# Patient Record
Sex: MALE | Race: WHITE | Employment: FULL TIME | URBAN - METROPOLITAN AREA
[De-identification: names, ages, dates, MRNs, and addresses within clinical notes are randomized per-mention and may not be internally consistent; named-entity substitution may affect disease eponyms.]

---

## 2021-10-08 ENCOUNTER — HOSPITAL ENCOUNTER (INPATIENT)
Age: 56
LOS: 4 days | Discharge: HOME | DRG: 254 | End: 2021-10-12
Attending: INTERNAL MEDICINE | Admitting: INTERNAL MEDICINE
Payer: MEDICAID

## 2021-10-08 ENCOUNTER — APPOINTMENT (OUTPATIENT)
Dept: MRI IMAGING | Age: 56
DRG: 254 | End: 2021-10-08
Attending: HOSPITALIST
Payer: MEDICAID

## 2021-10-08 PROBLEM — K83.09 CHOLANGITIS: Status: ACTIVE | Noted: 2021-10-08

## 2021-10-08 LAB
ALBUMIN SERPL-MCNC: 1.9 G/DL (ref 3.5–5)
ALBUMIN SERPL-MCNC: 1.9 G/DL (ref 3.5–5)
ALBUMIN/GLOB SERPL: 0.4 {RATIO} (ref 1.1–2.2)
ALBUMIN/GLOB SERPL: 0.5 {RATIO} (ref 1.1–2.2)
ALP SERPL-CCNC: 259 U/L (ref 45–117)
ALP SERPL-CCNC: 285 U/L (ref 45–117)
ALT SERPL-CCNC: 75 U/L (ref 12–78)
ALT SERPL-CCNC: 81 U/L (ref 12–78)
ANION GAP SERPL CALC-SCNC: 5 MMOL/L (ref 5–15)
APTT PPP: 26.6 SEC (ref 22.1–31)
AST SERPL-CCNC: 37 U/L (ref 15–37)
AST SERPL-CCNC: 40 U/L (ref 15–37)
BASOPHILS # BLD: 0 K/UL (ref 0–0.1)
BASOPHILS NFR BLD: 0 % (ref 0–1)
BILIRUB DIRECT SERPL-MCNC: 0.6 MG/DL (ref 0–0.2)
BILIRUB SERPL-MCNC: 1 MG/DL (ref 0.2–1)
BILIRUB SERPL-MCNC: 1 MG/DL (ref 0.2–1)
BUN SERPL-MCNC: 24 MG/DL (ref 6–20)
BUN/CREAT SERPL: 31 (ref 12–20)
CALCIUM SERPL-MCNC: 9 MG/DL (ref 8.5–10.1)
CHLORIDE SERPL-SCNC: 108 MMOL/L (ref 97–108)
CO2 SERPL-SCNC: 24 MMOL/L (ref 21–32)
CREAT SERPL-MCNC: 0.78 MG/DL (ref 0.7–1.3)
DIFFERENTIAL METHOD BLD: ABNORMAL
EOSINOPHIL # BLD: 0 K/UL (ref 0–0.4)
EOSINOPHIL NFR BLD: 0 % (ref 0–7)
ERYTHROCYTE [DISTWIDTH] IN BLOOD BY AUTOMATED COUNT: 15.3 % (ref 11.5–14.5)
GLOBULIN SER CALC-MCNC: 4.2 G/DL (ref 2–4)
GLOBULIN SER CALC-MCNC: 5.1 G/DL (ref 2–4)
GLUCOSE BLD STRIP.AUTO-MCNC: 100 MG/DL (ref 65–117)
GLUCOSE BLD STRIP.AUTO-MCNC: 111 MG/DL (ref 65–117)
GLUCOSE BLD STRIP.AUTO-MCNC: 84 MG/DL (ref 65–117)
GLUCOSE BLD STRIP.AUTO-MCNC: 96 MG/DL (ref 65–117)
GLUCOSE SERPL-MCNC: 95 MG/DL (ref 65–100)
HCT VFR BLD AUTO: 32.2 % (ref 36.6–50.3)
HGB BLD-MCNC: 10.4 G/DL (ref 12.1–17)
IMM GRANULOCYTES # BLD AUTO: 0.1 K/UL (ref 0–0.04)
IMM GRANULOCYTES NFR BLD AUTO: 1 % (ref 0–0.5)
LACTATE SERPL-SCNC: 0.7 MMOL/L (ref 0.4–2)
LYMPHOCYTES # BLD: 0.8 K/UL (ref 0.8–3.5)
LYMPHOCYTES NFR BLD: 7 % (ref 12–49)
MAGNESIUM SERPL-MCNC: 1.9 MG/DL (ref 1.6–2.4)
MCH RBC QN AUTO: 28.4 PG (ref 26–34)
MCHC RBC AUTO-ENTMCNC: 32.3 G/DL (ref 30–36.5)
MCV RBC AUTO: 88 FL (ref 80–99)
MONOCYTES # BLD: 0.4 K/UL (ref 0–1)
MONOCYTES NFR BLD: 3 % (ref 5–13)
NEUTS SEG # BLD: 11.1 K/UL (ref 1.8–8)
NEUTS SEG NFR BLD: 89 % (ref 32–75)
NRBC # BLD: 0 K/UL (ref 0–0.01)
NRBC BLD-RTO: 0 PER 100 WBC
PLATELET # BLD AUTO: 250 K/UL (ref 150–400)
PMV BLD AUTO: 8.5 FL (ref 8.9–12.9)
POTASSIUM SERPL-SCNC: 4 MMOL/L (ref 3.5–5.1)
PROT SERPL-MCNC: 6.1 G/DL (ref 6.4–8.2)
PROT SERPL-MCNC: 7 G/DL (ref 6.4–8.2)
RBC # BLD AUTO: 3.66 M/UL (ref 4.1–5.7)
SERVICE CMNT-IMP: NORMAL
SODIUM SERPL-SCNC: 137 MMOL/L (ref 136–145)
THERAPEUTIC RANGE,PTTT: NORMAL SECS (ref 58–77)
WBC # BLD AUTO: 12.5 K/UL (ref 4.1–11.1)

## 2021-10-08 PROCEDURE — 80053 COMPREHEN METABOLIC PANEL: CPT

## 2021-10-08 PROCEDURE — 74011250636 HC RX REV CODE- 250/636: Performed by: INTERNAL MEDICINE

## 2021-10-08 PROCEDURE — 74011000258 HC RX REV CODE- 258: Performed by: INTERNAL MEDICINE

## 2021-10-08 PROCEDURE — 74011250636 HC RX REV CODE- 250/636: Performed by: HOSPITALIST

## 2021-10-08 PROCEDURE — 74183 MRI ABD W/O CNTR FLWD CNTR: CPT

## 2021-10-08 PROCEDURE — C9113 INJ PANTOPRAZOLE SODIUM, VIA: HCPCS | Performed by: HOSPITALIST

## 2021-10-08 PROCEDURE — 85025 COMPLETE CBC W/AUTO DIFF WBC: CPT

## 2021-10-08 PROCEDURE — 87040 BLOOD CULTURE FOR BACTERIA: CPT

## 2021-10-08 PROCEDURE — 65660000000 HC RM CCU STEPDOWN

## 2021-10-08 PROCEDURE — 74011250637 HC RX REV CODE- 250/637: Performed by: HOSPITALIST

## 2021-10-08 PROCEDURE — 36415 COLL VENOUS BLD VENIPUNCTURE: CPT

## 2021-10-08 PROCEDURE — 83735 ASSAY OF MAGNESIUM: CPT

## 2021-10-08 PROCEDURE — 85730 THROMBOPLASTIN TIME PARTIAL: CPT

## 2021-10-08 PROCEDURE — 82962 GLUCOSE BLOOD TEST: CPT

## 2021-10-08 PROCEDURE — A9575 INJ GADOTERATE MEGLUMI 0.1ML: HCPCS | Performed by: INTERNAL MEDICINE

## 2021-10-08 PROCEDURE — 74011000258 HC RX REV CODE- 258: Performed by: HOSPITALIST

## 2021-10-08 PROCEDURE — 83605 ASSAY OF LACTIC ACID: CPT

## 2021-10-08 PROCEDURE — 80076 HEPATIC FUNCTION PANEL: CPT

## 2021-10-08 RX ORDER — ONDANSETRON 2 MG/ML
4 INJECTION INTRAMUSCULAR; INTRAVENOUS
Status: DISCONTINUED | OUTPATIENT
Start: 2021-10-08 | End: 2021-10-12 | Stop reason: HOSPADM

## 2021-10-08 RX ORDER — ENOXAPARIN SODIUM 100 MG/ML
40 INJECTION SUBCUTANEOUS DAILY
Status: DISCONTINUED | OUTPATIENT
Start: 2021-10-08 | End: 2021-10-08

## 2021-10-08 RX ORDER — ONDANSETRON 4 MG/1
4 TABLET, ORALLY DISINTEGRATING ORAL
Status: DISCONTINUED | OUTPATIENT
Start: 2021-10-08 | End: 2021-10-12 | Stop reason: HOSPADM

## 2021-10-08 RX ORDER — POLYETHYLENE GLYCOL 3350 17 G/17G
17 POWDER, FOR SOLUTION ORAL DAILY PRN
Status: DISCONTINUED | OUTPATIENT
Start: 2021-10-08 | End: 2021-10-12 | Stop reason: HOSPADM

## 2021-10-08 RX ORDER — SODIUM CHLORIDE 0.9 % (FLUSH) 0.9 %
5-40 SYRINGE (ML) INJECTION EVERY 8 HOURS
Status: DISCONTINUED | OUTPATIENT
Start: 2021-10-08 | End: 2021-10-12 | Stop reason: HOSPADM

## 2021-10-08 RX ORDER — SODIUM CHLORIDE 9 MG/ML
125 INJECTION, SOLUTION INTRAVENOUS CONTINUOUS
Status: DISCONTINUED | OUTPATIENT
Start: 2021-10-08 | End: 2021-10-08

## 2021-10-08 RX ORDER — SODIUM CHLORIDE 9 MG/ML
75 INJECTION, SOLUTION INTRAVENOUS CONTINUOUS
Status: DISCONTINUED | OUTPATIENT
Start: 2021-10-08 | End: 2021-10-09

## 2021-10-08 RX ORDER — DEXTROSE MONOHYDRATE AND SODIUM CHLORIDE 5; .9 G/100ML; G/100ML
100 INJECTION, SOLUTION INTRAVENOUS CONTINUOUS
Status: DISCONTINUED | OUTPATIENT
Start: 2021-10-08 | End: 2021-10-08

## 2021-10-08 RX ORDER — ACETAMINOPHEN 325 MG/1
650 TABLET ORAL
Status: DISCONTINUED | OUTPATIENT
Start: 2021-10-08 | End: 2021-10-12 | Stop reason: HOSPADM

## 2021-10-08 RX ORDER — SODIUM CHLORIDE 0.9 % (FLUSH) 0.9 %
5-40 SYRINGE (ML) INJECTION AS NEEDED
Status: DISCONTINUED | OUTPATIENT
Start: 2021-10-08 | End: 2021-10-12 | Stop reason: HOSPADM

## 2021-10-08 RX ORDER — MAGNESIUM SULFATE 100 %
4 CRYSTALS MISCELLANEOUS AS NEEDED
Status: DISCONTINUED | OUTPATIENT
Start: 2021-10-08 | End: 2021-10-12 | Stop reason: HOSPADM

## 2021-10-08 RX ORDER — GADOTERATE MEGLUMINE 376.9 MG/ML
20 INJECTION INTRAVENOUS
Status: COMPLETED | OUTPATIENT
Start: 2021-10-08 | End: 2021-10-08

## 2021-10-08 RX ORDER — INSULIN LISPRO 100 [IU]/ML
INJECTION, SOLUTION INTRAVENOUS; SUBCUTANEOUS EVERY 6 HOURS
Status: DISCONTINUED | OUTPATIENT
Start: 2021-10-08 | End: 2021-10-08 | Stop reason: SDUPTHER

## 2021-10-08 RX ORDER — DEXTROSE 50 % IN WATER (D50W) INTRAVENOUS SYRINGE
12.5-25 AS NEEDED
Status: DISCONTINUED | OUTPATIENT
Start: 2021-10-08 | End: 2021-10-08 | Stop reason: SDUPTHER

## 2021-10-08 RX ORDER — DEXTROSE 50 % IN WATER (D50W) INTRAVENOUS SYRINGE
12.5-25 AS NEEDED
Status: DISCONTINUED | OUTPATIENT
Start: 2021-10-08 | End: 2021-10-12 | Stop reason: HOSPADM

## 2021-10-08 RX ORDER — MAGNESIUM SULFATE 100 %
4 CRYSTALS MISCELLANEOUS AS NEEDED
Status: DISCONTINUED | OUTPATIENT
Start: 2021-10-08 | End: 2021-10-08 | Stop reason: SDUPTHER

## 2021-10-08 RX ORDER — INSULIN LISPRO 100 [IU]/ML
INJECTION, SOLUTION INTRAVENOUS; SUBCUTANEOUS EVERY 6 HOURS
Status: DISCONTINUED | OUTPATIENT
Start: 2021-10-08 | End: 2021-10-12 | Stop reason: HOSPADM

## 2021-10-08 RX ORDER — ACETAMINOPHEN 650 MG/1
650 SUPPOSITORY RECTAL
Status: DISCONTINUED | OUTPATIENT
Start: 2021-10-08 | End: 2021-10-12 | Stop reason: HOSPADM

## 2021-10-08 RX ORDER — SUCRALFATE 1 G/1
1 TABLET ORAL
Status: DISCONTINUED | OUTPATIENT
Start: 2021-10-08 | End: 2021-10-12 | Stop reason: HOSPADM

## 2021-10-08 RX ADMIN — GADOTERATE MEGLUMINE 20 ML: 376.9 INJECTION INTRAVENOUS at 14:30

## 2021-10-08 RX ADMIN — SODIUM CHLORIDE 100 ML: 900 INJECTION, SOLUTION INTRAVENOUS at 14:30

## 2021-10-08 RX ADMIN — PANTOPRAZOLE SODIUM 40 MG: 40 INJECTION, POWDER, FOR SOLUTION INTRAVENOUS at 08:53

## 2021-10-08 RX ADMIN — PIPERACILLIN AND TAZOBACTAM 3.38 G: 3; .375 INJECTION, POWDER, LYOPHILIZED, FOR SOLUTION INTRAVENOUS at 17:38

## 2021-10-08 RX ADMIN — Medication 10 ML: at 08:54

## 2021-10-08 RX ADMIN — SODIUM CHLORIDE 125 ML/HR: 9 INJECTION, SOLUTION INTRAVENOUS at 08:58

## 2021-10-08 RX ADMIN — SUCRALFATE 1 G: 1 TABLET ORAL at 21:09

## 2021-10-08 RX ADMIN — Medication 10 ML: at 15:07

## 2021-10-08 RX ADMIN — SODIUM CHLORIDE 75 ML/HR: 9 INJECTION, SOLUTION INTRAVENOUS at 21:10

## 2021-10-08 RX ADMIN — PIPERACILLIN AND TAZOBACTAM 3.38 G: 3; .375 INJECTION, POWDER, LYOPHILIZED, FOR SOLUTION INTRAVENOUS at 08:53

## 2021-10-08 RX ADMIN — SODIUM CHLORIDE 40 MG: 9 INJECTION, SOLUTION INTRAMUSCULAR; INTRAVENOUS; SUBCUTANEOUS at 21:09

## 2021-10-08 NOTE — H&P
[address]  HISTORY AND PHYSICAL    Name:  Codi Finney  MR#:  [id number]  :  1965  ACCOUNT #:  [de-identified]  ADMIT DATE:  10/08/2021      PRIMARY CARE PROVIDER:  Sergio Keller DO    SOURCE OF INFORMATION:  The patient. The patient is transferred from [facility] for management of acute cholangitis with possible sepsis. HISTORY OF PRESENT ILLNESS:  This is a 50-year-old  male with past medical history significant for hypertension and diabetes, who is transferred from [facility] for management of ascending cholangitis with possible sepsis and possible ERCP. The patient presented to [facility] ED with diffuse abdominal pain, fever, and nausea a week duration. He stated that he went to see his primary care physician yesterday and he had a lab work and he was given medication and went home. He said he took his medication but his fevers, chills, nausea, worse and went to [facility] ER. In the emergency room, he had blood workup which showed leukocytosis, elevated liver enzymes, lactic acid 3.8. He had ultrasound examination which showed liver slightly enlarged measuring 18. 6. He had also CT scan of the abdomen and pelvis which showed cholelithiasis without cholecystitis, 1-mm right renal stone, and multiple left renal stones non-obstructing. His ALT 97, AST is 63, alkaline phosphatase 399, and procalcitonin was 0.7. He was given IV antibiotics, IV fluids, Tylenol. Blood culture was drawn and transferred to [facility] emergency department. On evaluation here, he said his abdominal pain is improved. Vital signs on arrival was blood pressure 110/68, pulse 72, temperature 97.9, and saturation of oxygen 96%. He said he is fully vaccinated for COVID-19 and he said he was tested for COVID 19 and test result was negative at [facility].   He is transferred with his medical document and a copy of imaging study.    REVIEW OF SYSTEMS:  Pertinent positive findings mentioned in the HPI. All systems reviewed, no any other positive finding. PAST MEDICAL HISTORY:  1. Type 2 diabetes mellitus. 2.  Hypertension. PRIOR TO ADMISSION MEDICATION:  The patient takes metformin but he does not remember his blood pressure medication. ALLERGIES:  NO KNOWN DRUG ALLERGIES. SOCIAL HISTORY:  Lives by himself. No tobacco or alcohol abuse. Ambulates independently. Code status, full code. FAMILY HISTORY:  Father had history of diabetes, hyperlipidemia, hypertension, and heart disease. Mother had history of thyroid disease and cancer. PHYSICAL EXAMINATION:  VITAL SIGNS:  Blood pressure 110/68, pulse 72, temperature 97.9, respiratory rate 22, saturation of oxygen 96% on room air. GENERAL APPEARANCE:  The patient is alert, cooperative, not in acute cardiorespiratory distress. HEENT:  Pink conjunctivae. Anicteric sclerae. Moist tongue and buccal mucosa. LUNGS:  Clear to auscultation bilaterally. CHEST WALL:  No tenderness or deformity. CARDIOVASCULAR SYSTEM:  Regular rate and rhythm. S1 and S2 normal.  No murmur or gallop. ABDOMEN:  Soft. Slight tenderness on the right side of the abdomen. No palpable mass or organomegaly. Bowel sounds normal.  EXTREMITIES:  No cyanosis or edema. SKIN:  No rash or lesion. CENTRAL NERVOUS SYSTEM:  Conscious, well oriented to time, place, and person. Motor 5/5. Sensation intact. Cranial nerves II-XII grossly intact. LABORATORY DATA:  White blood cell count 12.5, hemoglobin 10.4, hematocrit 32.2, MCV 88, platelet count 265. Chemistry, sodium 137, potassium 4.0, chloride 108, CO2 of 24, anion gap 5, glucose 95, BUN 24, creatinine 0.78, BUN/creatinine ratio 31, calcium 9.0, magnesium 1.9. Total bilirubin 1.0, total protein 7, ALT 81, AST 40, alkaline phosphatase 285. ASSESSMENT:  1. Acute cholangitis with cholelithiasis with possible sepsis. 2.  Anemia.   3. Multiple renal stones without obstruction. 4.  Type 2 diabetes mellitus. 5.  Hypertension. PLAN:  1. Acute cholangitis, cholelithiasis with possible sepsis. Admit the patient to remote [facility] floor. He had leukocytosis 12.5, had recorded fever at [facility], temperature was 100.8 on 10/07/2021 at 20:52, lactic acid was 3.8, elevated liver enzyme, procalcitonin 0.7. keep the patient n.p.o. will start IV Zosyn, normal saline at 125 mL/hour, IV Protonix, p.r.n. Zofran, p.r.n. pain medication, MRCP, and consult GI. Follow up blood culture. Repeat lactic acid level and monitor liver function test.  2.  Anemia, unclear etiology, normal CT.  will do iron profile, Protonix. Monitor H and H.  3.  Multiple renal stones without obstruction. No hematuria. Urinalysis negative for evidence of infection. His white blood cell count 3-5, nitrite negative, leukocyte esterase negative. 4.  Type 2 diabetes mellitus. will check hemoglobin A1c. The patient n.p.o.  Monitor fingerstick glucose on sliding scale. 5.  Hypertension. Blood pressure normal.  The patient does not remember his home medication. Need to verify his outpatient medication. Deep venous thrombosis prophylaxis. Sequential compressive device. FUNCTIONAL STATUS PRIOR TO ADMISSION:  The patient ambulates independently.         Yanci Phelps MD      [id number]  D:  10/08/2021 8:48  T:  10/08/2021 10:40  JOB #:  [id number]

## 2021-10-08 NOTE — CONSULTS
[address] [place] [address] NOTE  [facility]  [phone] NP in-hospital cell phone M-F until 4:30  After 5pm or on weekends, please call  for physician on call        NAME:  Chica Ann   :   1965   MRN:   [id number]       Referring Physician: Suerkha Mata    Consult Date: 10/8/2021 9:39 AM    Chief Complaint: cholangitis     History of Present Illness:  Patient is a 61 y.o. who is seen in consultation at the request of Dr. Surekha Mata for cholangitis. Medical history as listed below. He presented to OSH for nausea, lower abdominal pain, and chills. He had been on antibiotics and nausea medication for ear issues. He was noted to have elevated LFT's  ALT 97, AST 63, t bili 1.6, plk phos 399, lipase 199, WBC 15.7. CT an US with cholelithiasis and CBD 3 mm. He denies alcohol or tobacco. Occasional NSAID's/tylenol. I have reviewed the emergency room note, hospital admission note, notes by all other clinicians who have seen the patient during this hospitalization to date. I have reviewed the problem list and the reason for this hospitalization. I have reviewed the allergies and the medications the patient was taking at home prior to this hospitalization. PMH:  No past medical history on file. PSH:  No past surgical history on file.     Allergies:  No Known Allergies    Home Medications:  None       Hospital Medications:  Current Facility-Administered Medications   Medication Dose Route Frequency    sodium chloride (NS) flush 5-40 mL  5-40 mL IntraVENous Q8H    sodium chloride (NS) flush 5-40 mL  5-40 mL IntraVENous PRN    acetaminophen (TYLENOL) tablet 650 mg  650 mg Oral Q6H PRN    Or    acetaminophen (TYLENOL) suppository 650 mg  650 mg Rectal Q6H PRN    polyethylene glycol (MIRALAX) packet 17 g  17 g Oral DAILY PRN    ondansetron (ZOFRAN ODT) tablet 4 mg  4 mg Oral Q8H PRN    Or    ondansetron (ZOFRAN) injection 4 mg  4 mg IntraVENous Q6H PRN    0.9% sodium chloride infusion  125 mL/hr IntraVENous CONTINUOUS    insulin lispro (HUMALOG) injection   SubCUTAneous Q6H    glucose chewable tablet 16 g  4 Tablet Oral PRN    glucagon (GLUCAGEN) injection 1 mg  1 mg IntraMUSCular PRN    piperacillin-tazobactam (ZOSYN) 3.375 g in 0.9% sodium chloride (MBP/ADV) 100 mL MBP  3.375 g IntraVENous Q8H    pantoprazole (PROTONIX) 40 mg in 0.9% sodium chloride 10 mL injection  40 mg IntraVENous DAILY    dextrose (D50W) injection syrg 12.5-25 g  12.5-25 g IntraVENous PRN       Social History:  Social History     Tobacco Use    Smoking status: Not on file   Substance Use Topics    Alcohol use: Not on file       Family History:  No family history on file. Review of Systems:  Constitutional: +fever, +chills, negative weight loss  Eyes:   negative visual changes  ENT:   negative sore throat, tongue or lip swelling  Respiratory:  negative cough, +dyspnea  Cards:  negative for chest pain, palpitations, lower extremity edema  GI:   See HPI  :  negative for frequency, dysuria  Integument:  negative for rash and pruritus  Heme:  negative for easy bruising and gum/nose bleeding  Musculoskeletal:negative for myalgias, back pain and muscle weakness  Neuro:    negative for headaches, dizziness, vertigo  Psych: negative for feelings of anxiety, depression     Objective:     Patient Vitals for the past 8 hrs:   BP Temp Pulse Resp SpO2   10/08/21 0852 110/68 97.8 °F (36.6 °C) 68 18 97 %   10/08/21 0434 110/68 97.9 °F (36.6 °C) 72 22 96 %     No intake/output data recorded. No intake/output data recorded.     EXAM:     CONST:  Pleasant male lying in bed, no acute distress   NEURO:  drowsy   HEENT: EOMI, no scleral icterus   LUNGS: No increased WOB   CARD:  Regular rate   ABD:  soft, generalized tenderness, no rebound, no masses, distended   EXT:  no edema, warm   PSYCH: not anxious     Data Review     Recent Labs 10/08/21  0651   WBC 12.5*   HGB 10.4*   HCT 32.2*        Recent Labs     10/08/21  0651      K 4.0      CO2 24   BUN 24*   CREA 0.78   GLU 95   CA 9.0     Recent Labs     10/08/21  0651   *   TP 7.0   ALB 1.9*   GLOB 5.1*     No results for input(s): INR, PTP, APTT, INREXT in the last 72 hours.        Assessment:     · Elevated LFTs: AST 40, ALT 81, alk phos 285, t bili 1. US and CT with gallstones and 3 mm CBD  · Concern for cholangitis     Patient Active Problem List   Diagnosis Code    Cholangitis K83.09     Plan:     · NPO  · Antibiotics   · Acute hepatitis panel  · Monitor LFT's  · MRCP pending   · Patient discussed with Dr. Ana Laura Contreras  · Thank you for allowing me to participate in care of Elaine Oconnell     Signed By: Xenia Morton NP     10/8/2021  9:39 AM

## 2021-10-09 LAB
COVID-19 RAPID TEST, COVR: NOT DETECTED
FERRITIN SERPL-MCNC: 182 NG/ML (ref 26–388)
FOLATE SERPL-MCNC: 18.9 NG/ML (ref 5–21)
GLUCOSE BLD STRIP.AUTO-MCNC: 120 MG/DL (ref 65–117)
GLUCOSE BLD STRIP.AUTO-MCNC: 238 MG/DL (ref 65–117)
GLUCOSE BLD STRIP.AUTO-MCNC: 88 MG/DL (ref 65–117)
IRON SATN MFR SERPL: 26 % (ref 20–50)
IRON SERPL-MCNC: 68 UG/DL (ref 35–150)
SERVICE CMNT-IMP: ABNORMAL
SERVICE CMNT-IMP: ABNORMAL
SERVICE CMNT-IMP: NORMAL
SOURCE, COVRS: NORMAL
TIBC SERPL-MCNC: 257 UG/DL (ref 250–450)
VIT B12 SERPL-MCNC: 705 PG/ML (ref 193–986)

## 2021-10-09 PROCEDURE — C9113 INJ PANTOPRAZOLE SODIUM, VIA: HCPCS | Performed by: HOSPITALIST

## 2021-10-09 PROCEDURE — 82746 ASSAY OF FOLIC ACID SERUM: CPT

## 2021-10-09 PROCEDURE — 83550 IRON BINDING TEST: CPT

## 2021-10-09 PROCEDURE — 82728 ASSAY OF FERRITIN: CPT

## 2021-10-09 PROCEDURE — 82607 VITAMIN B-12: CPT

## 2021-10-09 PROCEDURE — 74011250636 HC RX REV CODE- 250/636: Performed by: HOSPITALIST

## 2021-10-09 PROCEDURE — 74011250637 HC RX REV CODE- 250/637: Performed by: HOSPITALIST

## 2021-10-09 PROCEDURE — 65660000000 HC RM CCU STEPDOWN

## 2021-10-09 PROCEDURE — 36415 COLL VENOUS BLD VENIPUNCTURE: CPT

## 2021-10-09 PROCEDURE — 74011636637 HC RX REV CODE- 636/637: Performed by: INTERNAL MEDICINE

## 2021-10-09 PROCEDURE — 87635 SARS-COV-2 COVID-19 AMP PRB: CPT

## 2021-10-09 PROCEDURE — 82962 GLUCOSE BLOOD TEST: CPT

## 2021-10-09 PROCEDURE — 74011000258 HC RX REV CODE- 258: Performed by: HOSPITALIST

## 2021-10-09 RX ORDER — MORPHINE SULFATE 2 MG/ML
2 INJECTION, SOLUTION INTRAMUSCULAR; INTRAVENOUS
Status: DISCONTINUED | OUTPATIENT
Start: 2021-10-09 | End: 2021-10-12 | Stop reason: HOSPADM

## 2021-10-09 RX ADMIN — SUCRALFATE 1 G: 1 TABLET ORAL at 22:02

## 2021-10-09 RX ADMIN — SODIUM CHLORIDE 40 MG: 9 INJECTION, SOLUTION INTRAMUSCULAR; INTRAVENOUS; SUBCUTANEOUS at 22:01

## 2021-10-09 RX ADMIN — Medication 10 ML: at 22:01

## 2021-10-09 RX ADMIN — SUCRALFATE 1 G: 1 TABLET ORAL at 18:44

## 2021-10-09 RX ADMIN — SUCRALFATE 1 G: 1 TABLET ORAL at 07:01

## 2021-10-09 RX ADMIN — PIPERACILLIN AND TAZOBACTAM 3.38 G: 3; .375 INJECTION, POWDER, LYOPHILIZED, FOR SOLUTION INTRAVENOUS at 01:17

## 2021-10-09 RX ADMIN — INSULIN LISPRO 3 UNITS: 100 INJECTION, SOLUTION INTRAVENOUS; SUBCUTANEOUS at 18:44

## 2021-10-09 RX ADMIN — PIPERACILLIN AND TAZOBACTAM 3.38 G: 3; .375 INJECTION, POWDER, LYOPHILIZED, FOR SOLUTION INTRAVENOUS at 09:31

## 2021-10-09 RX ADMIN — SUCRALFATE 1 G: 1 TABLET ORAL at 13:09

## 2021-10-09 RX ADMIN — PIPERACILLIN AND TAZOBACTAM 3.38 G: 3; .375 INJECTION, POWDER, LYOPHILIZED, FOR SOLUTION INTRAVENOUS at 18:44

## 2021-10-09 RX ADMIN — SODIUM CHLORIDE 40 MG: 9 INJECTION, SOLUTION INTRAMUSCULAR; INTRAVENOUS; SUBCUTANEOUS at 09:31

## 2021-10-09 NOTE — PROGRESS NOTES
[address] [facility]  Hospitalist Group                                                                                          Hospitalist Progress Note  Olive Canales MD  Answering service: [phone] or [phone] from in house phone        Date of Service:  10/9/2021  NAME:  Claudio Brannon  :  1965  MRN:  [id number]      Admission Summary: This is a 79-year-old  male with past medical history significant for hypertension and diabetes, who is transferred from [facility] for management of ascending cholangitis with possible sepsis and possible ERCP. The patient presented to [facility] ED with diffuse abdominal pain, fever, and nausea a week duration. He stated that he went to see his primary care physician yesterday and he had a lab work and he was given medication and went home. He said he took his medication but his fevers, chills, nausea, worse and went to [facility] ER. Interval history / Subjective:     Pt has no specific complaints. No further episodes of chills. He was not aware of his MRCP results. We discussed the presence of mass at the GE junction. We discussed possibility of this being benign vs. Malignant. Pt somewhat concerned since he does not have insurance at this time, but will start on Nov.   GI planning endoscopy     Assessment & Plan:     GE junction mass  - MRCP neg for choledocholithiasis, but shows presence of GE junction mass  - GI following  - EGD planned for Mon with biopsy  - Advance diet as tolerated.    - IV PPI given ulcer is on differential     Sepsis - (Fever 100.8, WBC at OSH) - thought to be due to cholangitis, but no choledocholithiasis  - Continue IV Zosyn now   - F/U blood cultures, NGTD  - Check rapid covid, will need anyway prior to endoscopy    Anemia - likely chronic   - check iron profile, ferritin, B12, Folate  - transfuse for hemoglobin less than 12    Type 2 diabetes - Check A1c, SSI, POCT glucose checks and hypoglycemia protocol  Nephrolithiasis - Original pain was pelvic, ? Related to stones. Monitor, pain resolved. Get UA with micro hold    Code status: FULL  DVT prophylaxis: SCDs    Care Plan discussed with: Patient/Family  Anticipated Disposition: Home w/Family  Anticipated Discharge: 24 hours to 48 hours     Hospital Problems  Never Reviewed        Codes Class Noted POA    Cholangitis ICD-10-CM: K83.09  ICD-9-CM: 576.1  10/8/2021 Unknown                Review of Systems:   A comprehensive review of systems was negative except for that written in the HPI. Vital Signs:    Last 24hrs VS reviewed since prior progress note. Most recent are:  Visit Vitals  /70   Pulse 73   Temp 97.5 °F (36.4 °C)   Resp 18   Wt 99.8 kg (220 lb)   SpO2 98%       No intake or output data in the 24 hours ending 10/09/21 1459     Physical Examination:     I had a face to face encounter with this patient and independently examined them on 10/9/2021 as outlined below:          Constitutional:  No acute distress, cooperative, pleasant    ENT:  Oral mucosa moist, oropharynx benign. Resp:  CTA bilaterally. No wheezing/rhonchi/rales. No accessory muscle use   CV:  Regular rhythm, normal rate, no murmurs, gallops, rubs    GI:  Soft, non distended, non tender. normoactive bowel sounds, no hepatosplenomegaly     Musculoskeletal:  No edema, warm, 2+ pulses throughout    Neurologic:  Moves all extremities.   AAOx3, CN II-XII reviewed            Data Review:    Review and/or order of clinical lab test  Review and/or order of tests in the radiology section of CPT  Review and/or order of tests in the medicine section of CPT      Labs:     Recent Labs     10/08/21  0651   WBC 12.5*   HGB 10.4*   HCT 32.2*        Recent Labs     10/08/21  0651      K 4.0      CO2 24   BUN 24*   CREA 0.78   GLU 95   CA 9.0   MG 1.9     Recent Labs     10/08/21  1059 10/08/21  0651   ALT 75 81*   * 285*   TBILI 1.0 1.0 TP 6.1* 7.0   ALB 1.9* 1.9*   GLOB 4.2* 5.1*     Recent Labs     10/08/21  1059   APTT 26.6      No results for input(s): FE, TIBC, PSAT, FERR in the last 72 hours. No results found for: FOL, RBCF   No results for input(s): PH, PCO2, PO2 in the last 72 hours. No results for input(s): CPK, CKNDX, TROIQ in the last 72 hours.     No lab exists for component: CPKMB  No results found for: CHOL, CHOLX, CHLST, CHOLV, HDL, HDLP, LDL, LDLC, DLDLP, TGLX, TRIGL, TRIGP, CHHD, CHHDX  Lab Results   Component Value Date/Time    Glucose (POC) 120 (H) 10/09/2021 11:59 AM    Glucose (POC) 88 10/09/2021 06:53 AM    Glucose (POC) 100 10/08/2021 11:49 PM    Glucose (POC) 96 10/08/2021 04:08 PM    Glucose (POC) 84 10/08/2021 11:04 AM     No results found for: COLOR, APPRN, SPGRU, REFSG, BRANDEN, PROTU, GLUCU, KETU, BILU, UROU, EBONY, LEUKU, GLUKE, EPSU, BACTU, WBCU, RBCU, CASTS, UCRY      Medications Reviewed:     Current Facility-Administered Medications   Medication Dose Route Frequency    sodium chloride (NS) flush 5-40 mL  5-40 mL IntraVENous Q8H    sodium chloride (NS) flush 5-40 mL  5-40 mL IntraVENous PRN    acetaminophen (TYLENOL) tablet 650 mg  650 mg Oral Q6H PRN    Or    acetaminophen (TYLENOL) suppository 650 mg  650 mg Rectal Q6H PRN    polyethylene glycol (MIRALAX) packet 17 g  17 g Oral DAILY PRN    ondansetron (ZOFRAN ODT) tablet 4 mg  4 mg Oral Q8H PRN    Or    ondansetron (ZOFRAN) injection 4 mg  4 mg IntraVENous Q6H PRN    insulin lispro (HUMALOG) injection   SubCUTAneous Q6H    glucose chewable tablet 16 g  4 Tablet Oral PRN    glucagon (GLUCAGEN) injection 1 mg  1 mg IntraMUSCular PRN    piperacillin-tazobactam (ZOSYN) 3.375 g in 0.9% sodium chloride (MBP/ADV) 100 mL MBP  3.375 g IntraVENous Q8H    dextrose (D50W) injection syrg 12.5-25 g  12.5-25 g IntraVENous PRN    pantoprazole (PROTONIX) 40 mg in 0.9% sodium chloride 10 mL injection  40 mg IntraVENous Q12H    0.9% sodium chloride infusion  75 mL/hr IntraVENous CONTINUOUS    sucralfate (CARAFATE) tablet 1 g  1 g Oral AC&HS     ______________________________________________________________________  EXPECTED LENGTH OF STAY: - - -  ACTUAL LENGTH OF STAY:          1                 Dominique Park MD

## 2021-10-09 NOTE — PROGRESS NOTES
[address] October 9, 2021       RE: Justin Ernandez      To Whom It May Concern,    This is to certify that Justin Ernandez is currently hospitalized, and will be at least until Tuesday 10/09. This may be extended based on medical progress. Please feel free to contact my office if you have any questions or concerns. Thank you for your assistance in this matter.       Sincerely,  Prachi Patiño MD

## 2021-10-09 NOTE — ROUTINE PROCESS
Primary Nurse Javon Rosenthal RN and STEPHANIE Delacruz performed a dual skin assessment on this patient NO Impairment noted- see wound doc flow sheet  Sabino score is 20

## 2021-10-10 LAB
ANION GAP SERPL CALC-SCNC: 5 MMOL/L (ref 5–15)
BASOPHILS # BLD: 0 K/UL (ref 0–0.1)
BASOPHILS NFR BLD: 1 % (ref 0–1)
BUN SERPL-MCNC: 15 MG/DL (ref 6–20)
BUN/CREAT SERPL: 18 (ref 12–20)
CALCIUM SERPL-MCNC: 8.9 MG/DL (ref 8.5–10.1)
CHLORIDE SERPL-SCNC: 108 MMOL/L (ref 97–108)
CO2 SERPL-SCNC: 25 MMOL/L (ref 21–32)
CREAT SERPL-MCNC: 0.83 MG/DL (ref 0.7–1.3)
DIFFERENTIAL METHOD BLD: ABNORMAL
EOSINOPHIL # BLD: 0.1 K/UL (ref 0–0.4)
EOSINOPHIL NFR BLD: 2 % (ref 0–7)
ERYTHROCYTE [DISTWIDTH] IN BLOOD BY AUTOMATED COUNT: 14.9 % (ref 11.5–14.5)
EST. AVERAGE GLUCOSE BLD GHB EST-MCNC: 154 MG/DL
GLUCOSE BLD STRIP.AUTO-MCNC: 143 MG/DL (ref 65–117)
GLUCOSE BLD STRIP.AUTO-MCNC: 198 MG/DL (ref 65–117)
GLUCOSE BLD STRIP.AUTO-MCNC: 201 MG/DL (ref 65–117)
GLUCOSE BLD STRIP.AUTO-MCNC: 244 MG/DL (ref 65–117)
GLUCOSE SERPL-MCNC: 150 MG/DL (ref 65–100)
HBA1C MFR BLD: 7 % (ref 4–5.6)
HCT VFR BLD AUTO: 36.3 % (ref 36.6–50.3)
HGB BLD-MCNC: 11 G/DL (ref 12.1–17)
HGB BLD-MCNC: 11.4 G/DL (ref 12.1–17)
HGB BLD-MCNC: 11.5 G/DL (ref 12.1–17)
IMM GRANULOCYTES # BLD AUTO: 0 K/UL (ref 0–0.04)
IMM GRANULOCYTES NFR BLD AUTO: 1 % (ref 0–0.5)
LYMPHOCYTES # BLD: 1.1 K/UL (ref 0.8–3.5)
LYMPHOCYTES NFR BLD: 20 % (ref 12–49)
MAGNESIUM SERPL-MCNC: 1.8 MG/DL (ref 1.6–2.4)
MCH RBC QN AUTO: 28.3 PG (ref 26–34)
MCHC RBC AUTO-ENTMCNC: 31.4 G/DL (ref 30–36.5)
MCV RBC AUTO: 90.1 FL (ref 80–99)
MONOCYTES # BLD: 0.4 K/UL (ref 0–1)
MONOCYTES NFR BLD: 8 % (ref 5–13)
NEUTS SEG # BLD: 3.7 K/UL (ref 1.8–8)
NEUTS SEG NFR BLD: 68 % (ref 32–75)
NRBC # BLD: 0 K/UL (ref 0–0.01)
NRBC BLD-RTO: 0 PER 100 WBC
PHOSPHATE SERPL-MCNC: 3.6 MG/DL (ref 2.6–4.7)
PLATELET # BLD AUTO: 283 K/UL (ref 150–400)
PMV BLD AUTO: 8.4 FL (ref 8.9–12.9)
POTASSIUM SERPL-SCNC: 4 MMOL/L (ref 3.5–5.1)
RBC # BLD AUTO: 4.03 M/UL (ref 4.1–5.7)
SERVICE CMNT-IMP: ABNORMAL
SODIUM SERPL-SCNC: 138 MMOL/L (ref 136–145)
WBC # BLD AUTO: 5.5 K/UL (ref 4.1–11.1)

## 2021-10-10 PROCEDURE — 83735 ASSAY OF MAGNESIUM: CPT

## 2021-10-10 PROCEDURE — 74011250636 HC RX REV CODE- 250/636: Performed by: HOSPITALIST

## 2021-10-10 PROCEDURE — 74011636637 HC RX REV CODE- 636/637: Performed by: INTERNAL MEDICINE

## 2021-10-10 PROCEDURE — C9113 INJ PANTOPRAZOLE SODIUM, VIA: HCPCS | Performed by: HOSPITALIST

## 2021-10-10 PROCEDURE — 80048 BASIC METABOLIC PNL TOTAL CA: CPT

## 2021-10-10 PROCEDURE — 74011250637 HC RX REV CODE- 250/637: Performed by: HOSPITALIST

## 2021-10-10 PROCEDURE — 74011000250 HC RX REV CODE- 250: Performed by: INTERNAL MEDICINE

## 2021-10-10 PROCEDURE — 85025 COMPLETE CBC W/AUTO DIFF WBC: CPT

## 2021-10-10 PROCEDURE — 65660000000 HC RM CCU STEPDOWN

## 2021-10-10 PROCEDURE — 84100 ASSAY OF PHOSPHORUS: CPT

## 2021-10-10 PROCEDURE — 36415 COLL VENOUS BLD VENIPUNCTURE: CPT

## 2021-10-10 PROCEDURE — 83036 HEMOGLOBIN GLYCOSYLATED A1C: CPT

## 2021-10-10 PROCEDURE — 74011000258 HC RX REV CODE- 258: Performed by: HOSPITALIST

## 2021-10-10 PROCEDURE — 85018 HEMOGLOBIN: CPT

## 2021-10-10 RX ORDER — INSULIN GLARGINE 100 [IU]/ML
18 INJECTION, SOLUTION SUBCUTANEOUS
Status: DISCONTINUED | OUTPATIENT
Start: 2021-10-10 | End: 2021-10-10

## 2021-10-10 RX ORDER — INSULIN GLARGINE 100 [IU]/ML
10 INJECTION, SOLUTION SUBCUTANEOUS
Status: DISCONTINUED | OUTPATIENT
Start: 2021-10-10 | End: 2021-10-12 | Stop reason: HOSPADM

## 2021-10-10 RX ADMIN — PIPERACILLIN AND TAZOBACTAM 3.38 G: 3; .375 INJECTION, POWDER, LYOPHILIZED, FOR SOLUTION INTRAVENOUS at 18:08

## 2021-10-10 RX ADMIN — PIPERACILLIN AND TAZOBACTAM 3.38 G: 3; .375 INJECTION, POWDER, LYOPHILIZED, FOR SOLUTION INTRAVENOUS at 00:58

## 2021-10-10 RX ADMIN — SUCRALFATE 1 G: 1 TABLET ORAL at 21:18

## 2021-10-10 RX ADMIN — INSULIN LISPRO 3 UNITS: 100 INJECTION, SOLUTION INTRAVENOUS; SUBCUTANEOUS at 06:33

## 2021-10-10 RX ADMIN — SODIUM CHLORIDE 40 MG: 9 INJECTION, SOLUTION INTRAMUSCULAR; INTRAVENOUS; SUBCUTANEOUS at 21:17

## 2021-10-10 RX ADMIN — PIPERACILLIN AND TAZOBACTAM 3.38 G: 3; .375 INJECTION, POWDER, LYOPHILIZED, FOR SOLUTION INTRAVENOUS at 08:32

## 2021-10-10 RX ADMIN — INSULIN LISPRO 3 UNITS: 100 INJECTION, SOLUTION INTRAVENOUS; SUBCUTANEOUS at 13:08

## 2021-10-10 RX ADMIN — POLYETHYLENE GLYCOL-3350 AND ELECTROLYTES 4000 ML: 236; 6.74; 5.86; 2.97; 22.74 POWDER, FOR SOLUTION ORAL at 18:08

## 2021-10-10 RX ADMIN — SUCRALFATE 1 G: 1 TABLET ORAL at 18:08

## 2021-10-10 RX ADMIN — SUCRALFATE 1 G: 1 TABLET ORAL at 06:33

## 2021-10-10 RX ADMIN — INSULIN LISPRO 2 UNITS: 100 INJECTION, SOLUTION INTRAVENOUS; SUBCUTANEOUS at 18:08

## 2021-10-10 RX ADMIN — SODIUM CHLORIDE 40 MG: 9 INJECTION, SOLUTION INTRAMUSCULAR; INTRAVENOUS; SUBCUTANEOUS at 08:32

## 2021-10-10 RX ADMIN — Medication 10 ML: at 18:08

## 2021-10-10 RX ADMIN — INSULIN GLARGINE 10 UNITS: 100 INJECTION, SOLUTION SUBCUTANEOUS at 23:02

## 2021-10-10 RX ADMIN — SUCRALFATE 1 G: 1 TABLET ORAL at 13:07

## 2021-10-10 NOTE — PROGRESS NOTES
Problem: Falls - Risk of  Goal: *Absence of Falls  Description: Document Domingo Romeo Fall Risk and appropriate interventions in the flowsheet.   Outcome: Progressing Towards Goal  Note: Fall Risk Interventions:  Mobility Interventions: Communicate number of staff needed for ambulation/transfer

## 2021-10-10 NOTE — PROGRESS NOTES
Problem: Falls - Risk of  Goal: *Absence of Falls  Description: Document [place] [facility] Fall Risk and appropriate interventions in the flowsheet.   Outcome: Progressing Towards Goal  Note: Fall Risk Interventions:  Mobility Interventions: Communicate number of staff needed for ambulation/transfer

## 2021-10-10 NOTE — PROGRESS NOTES
[address]  [address] [facility], [address]  [phone]                GI PROGRESS NOTE      NAME:   Mike Sweeney   :    1965   MRN:    [id number]     Assessment/Plan   Lower abdominal pain- improving. Plan colonoscopy   Lesion lower esophagus- EGD tomorrow  Clears today and EGD/Colonoscopy with Dr Mercedes Bloom or partners tomorrow     Patient Active Problem List   Diagnosis Code    Cholangitis K83.09       Subjective:     Mike Sweeney is a 61 y.o.  male who was admitted with lower abdominal pain with chills and abnormal liver enzymes. MRI did not show any obvious choledocholithiasis. MRI did show questionable lesion in the lower esophagus. He reports that his last colonoscopy was about 5 years ago and just got a phone call that he should get another one done soon. He would like his colonoscopy done with a EGD as well. Lower abdominal discomfort is improved. No subsequent fever or chills. Review of Systems    Constitutional: negative fever, negative chills, negative weight loss  Eyes:   negative visual changes  ENT:   negative sore throat, tongue or lip swelling  Respiratory:  negative cough, negative dyspnea  Cards:  negative for chest pain, palpitations, lower extremity edema  GI:   See HPI  :  negative for frequency, dysuria  Integument:  negative for rash and pruritus  Heme:  negative for easy bruising and gum/nose bleeding  Musculoskel: negative for myalgias,  back pain and muscle weakness  Neuro: negative for headaches, dizziness, vertigo  Psych:  negative for feelings of anxiety, depression           Objective:     VITALS:   Last 24hrs VS reviewed since prior hospitalist progress note.  Most recent are:  Visit Vitals  /75   Pulse 68   Temp 98.2 °F (36.8 °C)   Resp 16   Wt 99.8 kg (220 lb)   SpO2 98%     No intake or output data in the 24 hours ending 10/10/21 1153     PHYSICAL EXAM:  General   well developed, well nourished, appears stated age, in no acute distress  EENT  Normocephalic, Atraumatic, PERRLA, EOMI, sclera clear, nares clear, pharynx normal  Neck   Supple without nodes or mass. No thyromegaly or bruit  Respiratory   Clear To Auscultation bilaterally - no wheezes, rales, rhonchi, or crackles  Cardiology  Regular Rate and Rythmn  - no murmurs, rubs or gallops  Abdominal  Soft, non-tender, non-distended, positive bowel sounds, no hepatosplenomegaly, no palpable mass  Extremities  No clubbing, cyanosis, or edema. Pulses intact. Back  No spinal or muscle pain. No CVAT. Skin  Normal skin turgor. No rashes or skin ulcers noted  Neurological  No focal neurological deficits noted  Psychological  Oriented x 3. Normal affect.        Lab Data   Recent Results (from the past 12 hour(s))   GLUCOSE, POC    Collection Time: 10/09/21 11:59 PM   Result Value Ref Range    Glucose (POC) 143 (H) 65 - 117 mg/dL    Performed by Jada Perez    MAGNESIUM    Collection Time: 10/10/21  2:31 AM   Result Value Ref Range    Magnesium 1.8 1.6 - 2.4 mg/dL   METABOLIC PANEL, BASIC    Collection Time: 10/10/21  2:31 AM   Result Value Ref Range    Sodium 138 136 - 145 mmol/L    Potassium 4.0 3.5 - 5.1 mmol/L    Chloride 108 97 - 108 mmol/L    CO2 25 21 - 32 mmol/L    Anion gap 5 5 - 15 mmol/L    Glucose 150 (H) 65 - 100 mg/dL    BUN 15 6 - 20 MG/DL    Creatinine 0.83 0.70 - 1.30 MG/DL    BUN/Creatinine ratio 18 12 - 20      GFR est AA >60 >60 ml/min/1.73m2    GFR est non-AA >60 >60 ml/min/1.73m2    Calcium 8.9 8.5 - 10.1 MG/DL   PHOSPHORUS    Collection Time: 10/10/21  2:31 AM   Result Value Ref Range    Phosphorus 3.6 2.6 - 4.7 MG/DL   CBC WITH AUTOMATED DIFF    Collection Time: 10/10/21  2:31 AM   Result Value Ref Range    WBC 5.5 4.1 - 11.1 K/uL    RBC 4.03 (L) 4.10 - 5.70 M/uL    HGB 11.4 (L) 12.1 - 17.0 g/dL    HCT 36.3 (L) 36.6 - 50.3 %    MCV 90.1 80.0 - 99.0 FL    MCH 28.3 26.0 - 34.0 PG    MCHC 31.4 30.0 - 36.5 g/dL    RDW 14.9 (H) 11.5 - 14.5 %    PLATELET 075 067 - 400 K/uL    MPV 8.4 (L) 8.9 - 12.9 FL    NRBC 0.0 0  WBC    ABSOLUTE NRBC 0.00 0.00 - 0.01 K/uL    NEUTROPHILS 68 32 - 75 %    LYMPHOCYTES 20 12 - 49 %    MONOCYTES 8 5 - 13 %    EOSINOPHILS 2 0 - 7 %    BASOPHILS 1 0 - 1 %    IMMATURE GRANULOCYTES 1 (H) 0.0 - 0.5 %    ABS. NEUTROPHILS 3.7 1.8 - 8.0 K/UL    ABS. LYMPHOCYTES 1.1 0.8 - 3.5 K/UL    ABS. MONOCYTES 0.4 0.0 - 1.0 K/UL    ABS. EOSINOPHILS 0.1 0.0 - 0.4 K/UL    ABS. BASOPHILS 0.0 0.0 - 0.1 K/UL    ABS. IMM.  GRANS. 0.0 0.00 - 0.04 K/UL    DF AUTOMATED     GLUCOSE, POC    Collection Time: 10/10/21  6:10 AM   Result Value Ref Range    Glucose (POC) 201 (H) 65 - 117 mg/dL    Performed by Ralf Ware          Medications: Reviewed    PMH/ reviewed - no change compared to H&P  Attending Physician: Betty Brady MD   Date/Time:  10/10/2021

## 2021-10-10 NOTE — PROGRESS NOTES
[facility] [facility] Adult  Hospitalist Group                                                                                          Hospitalist Progress Note  Patience Dietz MD  Answering service: [phone] OR [phone] from in house phone        Date of Service:  10/10/2021  NAME:  Joaquina Ordonez  :  1965  MRN:  [id number]      Admission Summary: This is a 58-year-old  male with past medical history significant for hypertension and diabetes, who is transferred from [facility] for management of ascending cholangitis with possible sepsis and possible ERCP. The patient presented to [facility] ED with diffuse abdominal pain, fever, and nausea a week duration. He stated that he went to see his primary care physician yesterday and he had a lab work and he was given medication and went home. He said he took his medication but his fevers, chills, nausea, worse and went to [facility] ER. Interval history / Subjective:   Pt with episode of BRBPR this am, then another. Assessment & Plan:     Acute GI bleed  BRBPR  - Check H and H again this afternooon, and then every 8 hours. - IV PPI already ordered  - Discussed with GI, likely will need to add C scope tomorrow. Clears for now   - PRBC if hemoglobin less than 7     GE junction mass  - MRCP neg for choledocholithiasis, but shows presence of GE junction mass  - GI following  - EGD planned for Mon with biopsy, likely will need to add C scope as well   - Tolerated diet, clears for now   - IV PPI     Sepsis - (Fever 100.8, WBC at OSH) - thought to be due to cholangitis, but no choledocholithiasis  - Continue IV Zosyn now   - F/U blood cultures, NGTD. Tan Diop Need to call OSH and follow up culture data   - rapid covid negative     Anemia - likely chronic   - check iron profile, ferritin, B12, Folate  - transfuse for hemoglobin less than 12    Type 2 diabetes - Check A1c, SSI, POCT glucose checks and hypoglycemia protocol  Nephrolithiasis - Original pain was pelvic, ? Related to stones. Monitor, pain resolved. Get UA with micro hold    Code status: FULL  DVT prophylaxis: SCDs    Care Plan discussed with: Patient/Family  Anticipated Disposition: Home w/Family  Anticipated Discharge: 24 hours to 48 hours      Hospital Problems  Never Reviewed        Codes Class Noted POA    Cholangitis ICD-10-CM: K83.09  ICD-9-CM: 576.1  10/8/2021 Unknown                Review of Systems:   A comprehensive review of systems was negative except for that written in the HPI. Vital Signs:    Last 24hrs VS reviewed since prior progress note. Most recent are:  Visit Vitals  /75   Pulse 75   Temp 98.2 °F (36.8 °C)   Resp 16   Wt 99.8 kg (220 lb)   SpO2 98%       No intake or output data in the 24 hours ending 10/10/21 1254     Physical Examination:     I had a face to face encounter with this patient and independently examined them on 10/10/2021 as outlined below:          Constitutional:  No acute distress, cooperative, pleasant    ENT:  Oral mucosa moist, oropharynx benign. Resp:  CTA bilaterally. No wheezing/rhonchi/rales. No accessory muscle use   CV:  Regular rhythm, normal rate, no murmurs, gallops, rubs    GI:  Soft, non distended, non tender. normoactive bowel sounds, no hepatosplenomegaly     Musculoskeletal:  No edema, warm, 2+ pulses throughout    Neurologic:  Moves all extremities.   AAOx3, CN II-XII reviewed            Data Review:    Review and/or order of clinical lab test  Review and/or order of tests in the radiology section of CPT  Review and/or order of tests in the medicine section of CPT      Labs:     Recent Labs     10/10/21  0231 10/08/21  0651   WBC 5.5 12.5*   HGB 11.4* 10.4*   HCT 36.3* 32.2*    250     Recent Labs     10/10/21  0231 10/08/21  0651    137   K 4.0 4.0    108   CO2 25 24   BUN 15 24*   CREA 0.83 0.78   * 95   CA 8.9 9.0   MG 1.8 1.9   PHOS 3.6  -- Recent Labs     10/08/21  1059 10/08/21  0651   ALT 75 81*   * 285*   TBILI 1.0 1.0   TP 6.1* 7.0   ALB 1.9* 1.9*   GLOB 4.2* 5.1*     Recent Labs     10/08/21  1059   APTT 26.6      Recent Labs     10/09/21  1756   TIBC 257   PSAT 26   FERR 182      Lab Results   Component Value Date/Time    Folate 18.9 10/09/2021 05:56 PM      No results for input(s): PH, PCO2, PO2 in the last 72 hours. No results for input(s): CPK, CKNDX, TROIQ in the last 72 hours.     No lab exists for component: CPKMB  No results found for: CHOL, CHOLX, CHLST, CHOLV, HDL, HDLP, LDL, LDLC, DLDLP, TGLX, TRIGL, TRIGP, CHHD, CHHDX  Lab Results   Component Value Date/Time    Glucose (POC) 244 (H) 10/10/2021 12:12 PM    Glucose (POC) 201 (H) 10/10/2021 06:10 AM    Glucose (POC) 143 (H) 10/09/2021 11:59 PM    Glucose (POC) 238 (H) 10/09/2021 04:31 PM    Glucose (POC) 120 (H) 10/09/2021 11:59 AM     No results found for: COLOR, APPRN, SPGRU, REFSG, BRANDEN, PROTU, GLUCU, KETU, BILU, UROU, EBONY, LEUKU, GLUKE, EPSU, BACTU, WBCU, RBCU, CASTS, UCRY      Medications Reviewed:     Current Facility-Administered Medications   Medication Dose Route Frequency    morphine injection 2 mg  2 mg IntraVENous Q4H PRN    sodium chloride (NS) flush 5-40 mL  5-40 mL IntraVENous Q8H    sodium chloride (NS) flush 5-40 mL  5-40 mL IntraVENous PRN    acetaminophen (TYLENOL) tablet 650 mg  650 mg Oral Q6H PRN    Or    acetaminophen (TYLENOL) suppository 650 mg  650 mg Rectal Q6H PRN    polyethylene glycol (MIRALAX) packet 17 g  17 g Oral DAILY PRN    ondansetron (ZOFRAN ODT) tablet 4 mg  4 mg Oral Q8H PRN    Or    ondansetron (ZOFRAN) injection 4 mg  4 mg IntraVENous Q6H PRN    insulin lispro (HUMALOG) injection   SubCUTAneous Q6H    glucose chewable tablet 16 g  4 Tablet Oral PRN    glucagon (GLUCAGEN) injection 1 mg  1 mg IntraMUSCular PRN    piperacillin-tazobactam (ZOSYN) 3.375 g in 0.9% sodium chloride (MBP/ADV) 100 mL MBP  3.375 g IntraVENous Q8H    dextrose (D50W) injection syrg 12.5-25 g  12.5-25 g IntraVENous PRN    pantoprazole (PROTONIX) 40 mg in 0.9% sodium chloride 10 mL injection  40 mg IntraVENous Q12H    sucralfate (CARAFATE) tablet 1 g  1 g Oral AC&HS     ______________________________________________________________________  EXPECTED LENGTH OF STAY: - - -  ACTUAL LENGTH OF STAY:          2                 Claudetta Grange, MD

## 2021-10-10 NOTE — PROGRESS NOTES
Problem: Falls - Risk of  Goal: *Absence of Falls  Description: Document Maria Del Carmen Grace Fall Risk and appropriate interventions in the flowsheet.   Outcome: Progressing Towards Goal  Note: Fall Risk Interventions:

## 2021-10-11 ENCOUNTER — ANESTHESIA (OUTPATIENT)
Dept: ENDOSCOPY | Age: 56
DRG: 254 | End: 2021-10-11
Payer: MEDICAID

## 2021-10-11 ENCOUNTER — ANESTHESIA EVENT (OUTPATIENT)
Dept: ENDOSCOPY | Age: 56
DRG: 254 | End: 2021-10-11
Payer: MEDICAID

## 2021-10-11 LAB
ALBUMIN SERPL-MCNC: 2.3 G/DL (ref 3.5–5)
ALBUMIN/GLOB SERPL: 0.4 {RATIO} (ref 1.1–2.2)
ALP SERPL-CCNC: 201 U/L (ref 45–117)
ALT SERPL-CCNC: 70 U/L (ref 12–78)
ANION GAP SERPL CALC-SCNC: 5 MMOL/L (ref 5–15)
AST SERPL-CCNC: 56 U/L (ref 15–37)
BASOPHILS # BLD: 0 K/UL (ref 0–0.1)
BASOPHILS NFR BLD: 1 % (ref 0–1)
BILIRUB DIRECT SERPL-MCNC: 0.3 MG/DL (ref 0–0.2)
BILIRUB SERPL-MCNC: 0.6 MG/DL (ref 0.2–1)
BUN SERPL-MCNC: 12 MG/DL (ref 6–20)
BUN/CREAT SERPL: 15 (ref 12–20)
CALCIUM SERPL-MCNC: 8.9 MG/DL (ref 8.5–10.1)
CHLORIDE SERPL-SCNC: 109 MMOL/L (ref 97–108)
CO2 SERPL-SCNC: 27 MMOL/L (ref 21–32)
CREAT SERPL-MCNC: 0.78 MG/DL (ref 0.7–1.3)
DIFFERENTIAL METHOD BLD: ABNORMAL
EOSINOPHIL # BLD: 0.1 K/UL (ref 0–0.4)
EOSINOPHIL NFR BLD: 2 % (ref 0–7)
ERYTHROCYTE [DISTWIDTH] IN BLOOD BY AUTOMATED COUNT: 14.2 % (ref 11.5–14.5)
GLOBULIN SER CALC-MCNC: 5.5 G/DL (ref 2–4)
GLUCOSE BLD STRIP.AUTO-MCNC: 127 MG/DL (ref 65–117)
GLUCOSE BLD STRIP.AUTO-MCNC: 127 MG/DL (ref 65–117)
GLUCOSE BLD STRIP.AUTO-MCNC: 172 MG/DL (ref 65–117)
GLUCOSE BLD STRIP.AUTO-MCNC: 178 MG/DL (ref 65–117)
GLUCOSE SERPL-MCNC: 134 MG/DL (ref 65–100)
HCT VFR BLD AUTO: 35.5 % (ref 36.6–50.3)
HGB BLD-MCNC: 11.4 G/DL (ref 12.1–17)
HGB BLD-MCNC: 11.8 G/DL (ref 12.1–17)
IMM GRANULOCYTES # BLD AUTO: 0.1 K/UL (ref 0–0.04)
IMM GRANULOCYTES NFR BLD AUTO: 1 % (ref 0–0.5)
LYMPHOCYTES # BLD: 1.2 K/UL (ref 0.8–3.5)
LYMPHOCYTES NFR BLD: 22 % (ref 12–49)
MAGNESIUM SERPL-MCNC: 1.8 MG/DL (ref 1.6–2.4)
MCH RBC QN AUTO: 28.2 PG (ref 26–34)
MCHC RBC AUTO-ENTMCNC: 32.1 G/DL (ref 30–36.5)
MCV RBC AUTO: 87.9 FL (ref 80–99)
MONOCYTES # BLD: 0.4 K/UL (ref 0–1)
MONOCYTES NFR BLD: 7 % (ref 5–13)
NEUTS SEG # BLD: 3.6 K/UL (ref 1.8–8)
NEUTS SEG NFR BLD: 67 % (ref 32–75)
NRBC # BLD: 0 K/UL (ref 0–0.01)
NRBC BLD-RTO: 0 PER 100 WBC
PHOSPHATE SERPL-MCNC: 2.8 MG/DL (ref 2.6–4.7)
PLATELET # BLD AUTO: 281 K/UL (ref 150–400)
PMV BLD AUTO: 8.2 FL (ref 8.9–12.9)
POTASSIUM SERPL-SCNC: 3.9 MMOL/L (ref 3.5–5.1)
PROT SERPL-MCNC: 7.8 G/DL (ref 6.4–8.2)
RBC # BLD AUTO: 4.04 M/UL (ref 4.1–5.7)
SERVICE CMNT-IMP: ABNORMAL
SODIUM SERPL-SCNC: 141 MMOL/L (ref 136–145)
WBC # BLD AUTO: 5.4 K/UL (ref 4.1–11.1)

## 2021-10-11 PROCEDURE — 76060000031 HC ANESTHESIA FIRST 0.5 HR: Performed by: INTERNAL MEDICINE

## 2021-10-11 PROCEDURE — C9113 INJ PANTOPRAZOLE SODIUM, VIA: HCPCS | Performed by: HOSPITALIST

## 2021-10-11 PROCEDURE — 88374 M/PHMTRC ALYS ISHQUANT/SEMIQ: CPT

## 2021-10-11 PROCEDURE — 36415 COLL VENOUS BLD VENIPUNCTURE: CPT

## 2021-10-11 PROCEDURE — 0DJ08ZZ INSPECTION OF UPPER INTESTINAL TRACT, VIA NATURAL OR ARTIFICIAL OPENING ENDOSCOPIC: ICD-10-PCS | Performed by: INTERNAL MEDICINE

## 2021-10-11 PROCEDURE — 85018 HEMOGLOBIN: CPT

## 2021-10-11 PROCEDURE — 88360 TUMOR IMMUNOHISTOCHEM/MANUAL: CPT

## 2021-10-11 PROCEDURE — 74011000250 HC RX REV CODE- 250: Performed by: HOSPITALIST

## 2021-10-11 PROCEDURE — 76040000019: Performed by: INTERNAL MEDICINE

## 2021-10-11 PROCEDURE — 0DJD8ZZ INSPECTION OF LOWER INTESTINAL TRACT, VIA NATURAL OR ARTIFICIAL OPENING ENDOSCOPIC: ICD-10-PCS | Performed by: INTERNAL MEDICINE

## 2021-10-11 PROCEDURE — 80048 BASIC METABOLIC PNL TOTAL CA: CPT

## 2021-10-11 PROCEDURE — 84100 ASSAY OF PHOSPHORUS: CPT

## 2021-10-11 PROCEDURE — 83735 ASSAY OF MAGNESIUM: CPT

## 2021-10-11 PROCEDURE — 74011000250 HC RX REV CODE- 250: Performed by: NURSE ANESTHETIST, CERTIFIED REGISTERED

## 2021-10-11 PROCEDURE — 74011250637 HC RX REV CODE- 250/637: Performed by: HOSPITALIST

## 2021-10-11 PROCEDURE — 85025 COMPLETE CBC W/AUTO DIFF WBC: CPT

## 2021-10-11 PROCEDURE — 88305 TISSUE EXAM BY PATHOLOGIST: CPT

## 2021-10-11 PROCEDURE — 74011000258 HC RX REV CODE- 258: Performed by: HOSPITALIST

## 2021-10-11 PROCEDURE — 74011250636 HC RX REV CODE- 250/636: Performed by: NURSE ANESTHETIST, CERTIFIED REGISTERED

## 2021-10-11 PROCEDURE — 2709999900 HC NON-CHARGEABLE SUPPLY: Performed by: INTERNAL MEDICINE

## 2021-10-11 PROCEDURE — 65660000000 HC RM CCU STEPDOWN

## 2021-10-11 PROCEDURE — 74011250636 HC RX REV CODE- 250/636: Performed by: HOSPITALIST

## 2021-10-11 PROCEDURE — 77030021593 HC FCPS BIOP ENDOSC BSC -A: Performed by: INTERNAL MEDICINE

## 2021-10-11 PROCEDURE — 88377 M/PHMTRC ALYS ISHQUANT/SEMIQ: CPT

## 2021-10-11 PROCEDURE — 82962 GLUCOSE BLOOD TEST: CPT

## 2021-10-11 PROCEDURE — 80076 HEPATIC FUNCTION PANEL: CPT

## 2021-10-11 RX ORDER — SODIUM CHLORIDE 0.9 % (FLUSH) 0.9 %
5-40 SYRINGE (ML) INJECTION AS NEEDED
Status: DISCONTINUED | OUTPATIENT
Start: 2021-10-11 | End: 2021-10-12 | Stop reason: HOSPADM

## 2021-10-11 RX ORDER — DEXTROMETHORPHAN/PSEUDOEPHED 2.5-7.5/.8
1.2 DROPS ORAL
Status: DISCONTINUED | OUTPATIENT
Start: 2021-10-11 | End: 2021-10-11 | Stop reason: HOSPADM

## 2021-10-11 RX ORDER — SODIUM CHLORIDE 0.9 % (FLUSH) 0.9 %
5-40 SYRINGE (ML) INJECTION EVERY 8 HOURS
Status: DISCONTINUED | OUTPATIENT
Start: 2021-10-11 | End: 2021-10-12 | Stop reason: HOSPADM

## 2021-10-11 RX ORDER — MIDAZOLAM HYDROCHLORIDE 1 MG/ML
.25-5 INJECTION, SOLUTION INTRAMUSCULAR; INTRAVENOUS
Status: DISCONTINUED | OUTPATIENT
Start: 2021-10-11 | End: 2021-10-11 | Stop reason: HOSPADM

## 2021-10-11 RX ORDER — FENTANYL CITRATE 50 UG/ML
12.5-2 INJECTION, SOLUTION INTRAMUSCULAR; INTRAVENOUS
Status: DISCONTINUED | OUTPATIENT
Start: 2021-10-11 | End: 2021-10-11 | Stop reason: HOSPADM

## 2021-10-11 RX ORDER — EPINEPHRINE 0.1 MG/ML
1 INJECTION INTRACARDIAC; INTRAVENOUS
Status: DISCONTINUED | OUTPATIENT
Start: 2021-10-11 | End: 2021-10-11 | Stop reason: HOSPADM

## 2021-10-11 RX ORDER — GUAIFENESIN 100 MG/5ML
81 LIQUID (ML) ORAL DAILY
COMMUNITY

## 2021-10-11 RX ORDER — ATROPINE SULFATE 0.1 MG/ML
0.5 INJECTION INTRAVENOUS
Status: DISCONTINUED | OUTPATIENT
Start: 2021-10-11 | End: 2021-10-11 | Stop reason: HOSPADM

## 2021-10-11 RX ORDER — PROPOFOL 10 MG/ML
INJECTION, EMULSION INTRAVENOUS AS NEEDED
Status: DISCONTINUED | OUTPATIENT
Start: 2021-10-11 | End: 2021-10-11 | Stop reason: HOSPADM

## 2021-10-11 RX ORDER — LIDOCAINE HYDROCHLORIDE 20 MG/ML
INJECTION, SOLUTION INFILTRATION; PERINEURAL AS NEEDED
Status: DISCONTINUED | OUTPATIENT
Start: 2021-10-11 | End: 2021-10-11 | Stop reason: HOSPADM

## 2021-10-11 RX ORDER — SODIUM CHLORIDE 9 MG/ML
INJECTION, SOLUTION INTRAVENOUS
Status: DISCONTINUED | OUTPATIENT
Start: 2021-10-11 | End: 2021-10-11 | Stop reason: HOSPADM

## 2021-10-11 RX ORDER — NALOXONE HYDROCHLORIDE 0.4 MG/ML
0.4 INJECTION, SOLUTION INTRAMUSCULAR; INTRAVENOUS; SUBCUTANEOUS
Status: DISCONTINUED | OUTPATIENT
Start: 2021-10-11 | End: 2021-10-11 | Stop reason: HOSPADM

## 2021-10-11 RX ORDER — SODIUM CHLORIDE 9 MG/ML
75 INJECTION, SOLUTION INTRAVENOUS CONTINUOUS
Status: DISCONTINUED | OUTPATIENT
Start: 2021-10-11 | End: 2021-10-11

## 2021-10-11 RX ORDER — FLUMAZENIL 0.1 MG/ML
0.2 INJECTION INTRAVENOUS
Status: DISCONTINUED | OUTPATIENT
Start: 2021-10-11 | End: 2021-10-11 | Stop reason: HOSPADM

## 2021-10-11 RX ADMIN — PROPOFOL 120 MG: 10 INJECTION, EMULSION INTRAVENOUS at 14:22

## 2021-10-11 RX ADMIN — SODIUM CHLORIDE 40 MG: 9 INJECTION, SOLUTION INTRAMUSCULAR; INTRAVENOUS; SUBCUTANEOUS at 21:55

## 2021-10-11 RX ADMIN — SODIUM CHLORIDE 40 MG: 9 INJECTION, SOLUTION INTRAMUSCULAR; INTRAVENOUS; SUBCUTANEOUS at 09:25

## 2021-10-11 RX ADMIN — PROPOFOL 50 MG: 10 INJECTION, EMULSION INTRAVENOUS at 14:26

## 2021-10-11 RX ADMIN — PIPERACILLIN AND TAZOBACTAM 3.38 G: 3; .375 INJECTION, POWDER, LYOPHILIZED, FOR SOLUTION INTRAVENOUS at 01:08

## 2021-10-11 RX ADMIN — SUCRALFATE 1 G: 1 TABLET ORAL at 17:16

## 2021-10-11 RX ADMIN — PROPOFOL 50 MG: 10 INJECTION, EMULSION INTRAVENOUS at 14:41

## 2021-10-11 RX ADMIN — Medication 10 ML: at 22:00

## 2021-10-11 RX ADMIN — PIPERACILLIN AND TAZOBACTAM 3.38 G: 3; .375 INJECTION, POWDER, LYOPHILIZED, FOR SOLUTION INTRAVENOUS at 17:17

## 2021-10-11 RX ADMIN — PROPOFOL 50 MG: 10 INJECTION, EMULSION INTRAVENOUS at 14:35

## 2021-10-11 RX ADMIN — PROPOFOL 30 MG: 10 INJECTION, EMULSION INTRAVENOUS at 14:24

## 2021-10-11 RX ADMIN — SODIUM CHLORIDE: 900 INJECTION, SOLUTION INTRAVENOUS at 14:07

## 2021-10-11 RX ADMIN — SUCRALFATE 1 G: 1 TABLET ORAL at 21:55

## 2021-10-11 RX ADMIN — PROPOFOL 50 MG: 10 INJECTION, EMULSION INTRAVENOUS at 14:30

## 2021-10-11 RX ADMIN — LIDOCAINE HYDROCHLORIDE 100 MG: 20 INJECTION, SOLUTION INFILTRATION; PERINEURAL at 14:22

## 2021-10-11 RX ADMIN — PIPERACILLIN AND TAZOBACTAM 3.38 G: 3; .375 INJECTION, POWDER, LYOPHILIZED, FOR SOLUTION INTRAVENOUS at 09:49

## 2021-10-11 NOTE — PROGRESS NOTES
Ct abdomen from OSH read, obtained from [facility] 10/08    Impression    IMPRESSION:   1. Cholelithiasis is present without cholecystitis. 2. 1 mm right renal stones. 3. Multiple left renal stones without obstruction. COMMENTS:   Consistent with the [employer] of [employer]'s Incidental Findings   Committee white paper Carrol Shaw Am Krysta Radiol 2018): Any incidental renal lesion less   than 1 cm or classified as too small to characterize, or any incidental cystic   renal lesion characterized as simple-appearing, is likely benign. No follow-up   imaging is recommended for these lesions per consensus recommendations based on   imaging criteria.      THIS DOCUMENT HAS BEEN ELECTRONICALLY SIGNED Crystal Martin MD

## 2021-10-11 NOTE — PROGRESS NOTES
Problem: Falls - Risk of  Goal: *Absence of Falls  Description: Document Thompson Styles Fall Risk and appropriate interventions in the flowsheet.   Outcome: Progressing Towards Goal  Note: Fall Risk Interventions:  Mobility Interventions: Communicate number of staff needed for ambulation/transfer

## 2021-10-11 NOTE — PROGRESS NOTES
[facility] [facility] Adult  Hospitalist Group                                                                                          Hospitalist Progress Note  Anna Hewitt MD  Answering service: [phone] OR [phone] from in house phone        Date of Service:  10/11/2021  NAME:  Evens Combs  :  1965  MRN:  [id number]      Admission Summary: This is a 50-year-old  male with past medical history significant for hypertension and diabetes, who is transferred from [facility] for management of ascending cholangitis with possible sepsis and possible ERCP. The patient presented to [facility] ED with diffuse abdominal pain, fever, and nausea a week duration. He stated that he went to see his primary care physician yesterday and he had a lab work and he was given medication and went home. He said he took his medication but his fevers, chills, nausea, worse and went to [facility] ER. Interval history / Subjective:   Anxious a bit about his tests today. Completed the prep and is stooling clear. Plan for EGD/C scope afterwards. Assessment & Plan:     Acute GI bleed  BRBPR  - H and H can be spaced to daily, unless having significant bleeding.  - IV PPI   - PRBC if hemoglobin less than 7    - Colonoscopy today     GE junction mass  - MRCP neg for choledocholithiasis, but shows presence of GE junction mass  - GI following  - EGD planned for today 10/11  - Tolerated diet, clears for now    - IV PPI     Sepsis - (Fever 100.8, WBC at OSH) - thought to be due to cholangitis, but no choledocholithiasis  - Continue IV Zosyn now   - F/U blood cultures, NGTD.  Will call [facility] to follow up on OSH cultures today ([phone])  - Rapid covid negative     Anemia - likely chronic   - iron profile, ferritin, B12, Folate --> all normal   - transfuse for hemoglobin less than 7    Type 2 diabetes with hyperglycemia A1c 7.0% - SSI, POCT glucose checks and hypoglycemia protocol  Nephrolithiasis - Original pain was pelvic, ? Related to stones. Monitor, pain resolved. Get UA with micro hold    Code status: FULL  DVT prophylaxis: SCDs    Care Plan discussed with: Patient/Family  Anticipated Disposition: Home w/Family  Anticipated Discharge: Less than 24 hours , likely post biopsy. Pt does not have insurance and would like to limit cost to his hospital stay. Hospital Problems  Never Reviewed        Codes Class Noted POA    Cholangitis ICD-10-CM: K83.09  ICD-9-CM: 576.1  10/8/2021 Unknown                Review of Systems:   A comprehensive review of systems was negative except for that written in the HPI. Vital Signs:    Last 24hrs VS reviewed since prior progress note. Most recent are:  Visit Vitals  BP (!) 144/98   Pulse 64   Temp 98.5 °F (36.9 °C)   Resp 20   Wt 99.8 kg (220 lb)   SpO2 97%       No intake or output data in the 24 hours ending 10/11/21 1349     Physical Examination:     I had a face to face encounter with this patient and independently examined them on 10/11/2021 as outlined below:          Constitutional:  No acute distress, cooperative, pleasant    ENT:  Oral mucosa moist, oropharynx benign. Resp:  CTA bilaterally. No wheezing/rhonchi/rales. No accessory muscle use   CV:  Regular rhythm, normal rate, no murmurs, gallops, rubs    GI:  Soft, non distended, non tender. normoactive bowel sounds, no hepatosplenomegaly     Musculoskeletal:  No edema, warm, 2+ pulses throughout    Neurologic:  Moves all extremities.   AAOx3, CN II-XII reviewed            Data Review:    Review and/or order of clinical lab test  Review and/or order of tests in the radiology section of CPT  Review and/or order of tests in the medicine section of CPT      Labs:     Recent Labs     10/11/21  0345 10/10/21  2120 10/10/21  1441 10/10/21  0231   WBC 5.4  --   --  5.5   HGB 11.4* 11.5*   < > 11.4*   HCT 35.5*  --   --  36.3*     --   --  283    < > = values in this interval not displayed. Recent Labs     10/11/21  0345 10/10/21  0231    138   K 3.9 4.0   * 108   CO2 27 25   BUN 12 15   CREA 0.78 0.83   * 150*   CA 8.9 8.9   MG 1.8 1.8   PHOS 2.8 3.6     Recent Labs     10/11/21  1218   ALT 70   *   TBILI 0.6   TP 7.8   ALB 2.3*   GLOB 5.5*     No results for input(s): INR, PTP, APTT, INREXT, INREXT in the last 72 hours. Recent Labs     10/09/21  1756   TIBC 257   PSAT 26   FERR 182      Lab Results   Component Value Date/Time    Folate 18.9 10/09/2021 05:56 PM      No results for input(s): PH, PCO2, PO2 in the last 72 hours. No results for input(s): CPK, CKNDX, TROIQ in the last 72 hours.     No lab exists for component: CPKMB  No results found for: CHOL, CHOLX, CHLST, CHOLV, HDL, HDLP, LDL, LDLC, DLDLP, TGLX, TRIGL, TRIGP, CHHD, CHHDX  Lab Results   Component Value Date/Time    Glucose (POC) 172 (H) 10/11/2021 11:11 AM    Glucose (POC) 127 (H) 10/11/2021 06:41 AM    Glucose (POC) 127 (H) 10/11/2021 12:09 AM    Glucose (POC) 198 (H) 10/10/2021 04:21 PM    Glucose (POC) 244 (H) 10/10/2021 12:12 PM     No results found for: COLOR, APPRN, SPGRU, REFSG, BRANDEN, PROTU, GLUCU, KETU, BILU, UROU, EBONY, LEUKU, GLUKE, EPSU, BACTU, WBCU, RBCU, CASTS, UCRY      Medications Reviewed:     Current Facility-Administered Medications   Medication Dose Route Frequency    0.9% sodium chloride infusion  75 mL/hr IntraVENous CONTINUOUS    sodium chloride (NS) flush 5-40 mL  5-40 mL IntraVENous Q8H    sodium chloride (NS) flush 5-40 mL  5-40 mL IntraVENous PRN    insulin glargine (LANTUS) injection 10 Units  10 Units SubCUTAneous QHS    morphine injection 2 mg  2 mg IntraVENous Q4H PRN    sodium chloride (NS) flush 5-40 mL  5-40 mL IntraVENous Q8H    sodium chloride (NS) flush 5-40 mL  5-40 mL IntraVENous PRN    acetaminophen (TYLENOL) tablet 650 mg  650 mg Oral Q6H PRN    Or    acetaminophen (TYLENOL) suppository 650 mg  650 mg Rectal Q6H PRN    polyethylene glycol (MIRALAX) packet 17 g  17 g Oral DAILY PRN    ondansetron (ZOFRAN ODT) tablet 4 mg  4 mg Oral Q8H PRN    Or    ondansetron (ZOFRAN) injection 4 mg  4 mg IntraVENous Q6H PRN    insulin lispro (HUMALOG) injection   SubCUTAneous Q6H    glucose chewable tablet 16 g  4 Tablet Oral PRN    glucagon (GLUCAGEN) injection 1 mg  1 mg IntraMUSCular PRN    piperacillin-tazobactam (ZOSYN) 3.375 g in 0.9% sodium chloride (MBP/ADV) 100 mL MBP  3.375 g IntraVENous Q8H    dextrose (D50W) injection syrg 12.5-25 g  12.5-25 g IntraVENous PRN    pantoprazole (PROTONIX) 40 mg in 0.9% sodium chloride 10 mL injection  40 mg IntraVENous Q12H    sucralfate (CARAFATE) tablet 1 g  1 g Oral AC&HS     ______________________________________________________________________  EXPECTED LENGTH OF STAY: - - -  ACTUAL LENGTH OF STAY:          3                 Phi Larios MD

## 2021-10-11 NOTE — PROGRESS NOTES

## 2021-10-11 NOTE — PROCEDURES
[address] [place] [address], [address] [place], [place] [address]  [phone]               Colonoscopy Operative Report      Indications:  Personal h/o colon polyps, last colonoscopy 5 yrs back    :  Talib Ferrer MD    Staff: Endoscopy Technician-1: Arcenio Arrieta  Endoscopy RN-1: Adonay Blanchard RN     Referring Provider: Marnie Rosas DO    Sedation:  MAC anesthesia    Procedure Details:  After informed consent was obtained with all risks and benefits of procedure explained and preoperative exam completed, the patient was taken to the endoscopy suite and placed in the left lateral decubitus position. Upon sequential sedation as per above, a digital rectal exam was performed  And was normal.  The Olympus videocolonoscope  was inserted in the rectum and carefully advanced to the cecum, which was identified by the ileocecal valve and appendiceal orifice. The quality of preparation was fair. The colonoscope was slowly withdrawn with careful evaluation between folds. Retroflexion in the rectum was performed and was normal..     Findings:   Rectum: Grade 2 internal hemorrhoid(s); Sigmoid: normal  Descending Colon: normal  Transverse Colon: normal  Ascending Colon:     - Diverticulosis  Cecum: normal  Terminal Ileum: not intubated    Interventions:  none    Specimen Removed: None    EBL:  None    Complications:  None; patient tolerated the procedure well. Impression:  -Fair prep  -Mild right colon diverticulosis and moderate sized internal hemorrhoids  -Otherwise negative colon. Recommendations:   -Resume normal medication(s). -No Non-Steroidal Anti Inflammatorys (NSAIDS)      -Clear liquid diet and advance as tolerated. -Repeat colonoscopy in 5 years. Discharge Disposition:  Home in the company of a  when able to ambulate.     Talib Ferrer MD  10/11/2021  2:55 PM

## 2021-10-11 NOTE — ANESTHESIA PREPROCEDURE EVALUATION
Relevant Problems   No relevant active problems       Anesthetic History   No history of anesthetic complications            Review of Systems / Medical History  Patient summary reviewed, nursing notes reviewed and pertinent labs reviewed    Pulmonary  Within defined limits                 Neuro/Psych   Within defined limits           Cardiovascular    Hypertension: well controlled                   GI/Hepatic/Renal  Within defined limits              Endo/Other    Diabetes: well controlled, type 2         Other Findings              Physical Exam    Airway  Mallampati: II  TM Distance: > 6 cm  Neck ROM: normal range of motion   Mouth opening: Normal     Cardiovascular  Regular rate and rhythm,  S1 and S2 normal,  no murmur, click, rub, or gallop             Dental  No notable dental hx       Pulmonary  Breath sounds clear to auscultation               Abdominal  GI exam deferred       Other Findings            Anesthetic Plan    ASA: 3  Anesthesia type: MAC            Anesthetic plan and risks discussed with: Patient

## 2021-10-11 NOTE — PROGRESS NOTES
[id number] TRANSFER - IN REPORT:    Verbal report received from Abby Soulier (name) on Gerardoos Vidal  being received from [address] [place] [address]  for ordered procedure  EGD/Colonoscopy w/ Dr. Harriet Pena    Report consisted of patients Situation, Background, Assessment and   Recommendations(SBAR). Information from the following report(s) SBAR, Kardex, ED Summary, Intake/Output, MAR and Recent Results was reviewed with the receiving nurse. Opportunity for questions and clarification was provided. Assessment completed upon patients arrival to unit and care assumed. [id number] TRANSFER - OUT REPORT:    Verbal report given to Emily Ramirez RN (name) on Arron Vidal  being transferred to [address] [place], [place] [id number]  (unit) for routine post - op       Report consisted of patients Situation, Background, Assessment and   Recommendations(SBAR). Information from the following report(s) SBAR, Kardex, Procedure Summary, Intake/Output and MAR was reviewed with the receiving nurse. Lines:   Peripheral IV 10/11/21 Anterior; Left Hand (Active)        Opportunity for questions and clarification was provided.       Patient transported with:   [employer]

## 2021-10-11 NOTE — PROGRESS NOTES
Spiritual Care Assessment/Progress Note  [facility]      NAME: Merari Simmons      MRN: [id number]  AGE: 61 y.o.  SEX: male  Buddhism Affiliation: Non Episcopalian   Language: English     10/11/2021     Total Time (in minutes): 35     Spiritual Assessment begun in [facility] ENDOSCOPY through conversation with:         [x]Patient        [] Family    [] Friend(s)        Reason for Consult: Request by patient     Spiritual beliefs: (Please include comment if needed)     [x] Identifies with a ponce tradition:         [] Supported by a ponce community:            [] Claims no spiritual orientation:           [] Seeking spiritual identity:                [] Adheres to an individual form of spirituality:           [] Not able to assess:                           Identified resources for coping:      [x] Prayer                               [] Music                  [] Guided Imagery     [x] Family/friends                 [] Pet visits     [] Devotional reading                         [] Unknown     [] Other:                                              Interventions offered during this visit: (See comments for more details)    Patient Interventions: Affirmation of emotions/emotional suffering, Affirmation of ponce, Catharsis/review of pertinent events in supportive environment, Coping skills reviewed/reinforced, Iconic (affirming the presence of God/Higher Power), Normalization of emotional/spiritual concerns, Prayer (actual), Prayer (assurance of)           Plan of Care:     [] Support spiritual and/or cultural needs    [] Support AMD and/or advance care planning process      [] Support grieving process   [] Coordinate Rites and/or Rituals    [] Coordination with community clergy   [] No spiritual needs identified at this time   [] Detailed Plan of Care below (See Comments)  [] Make referral to Music Therapy  [] Make referral to Pet Therapy     [] Make referral to Addiction services  [] Make referral to [facility] Passages  [] Make referral to Spiritual Care Partner  [] No future visits requested        [x] Follow up upon further referrals     Comments:  visit per pt request. Pt shared his worries and just feeling hopeful that the outcome of his visit and procedure are positive. Stephen Moe also shared it feels hard because he doesn't live in [place] he is about 2.5 hours away so not easy to have visitors. Provided pastoral listening, support and prayer. Let him know of  availability. Please contact [phone] for further support.      [address] Graham Rosa, MACE   [phone])

## 2021-10-11 NOTE — PROCEDURES
[address], [address] [place] [address]  [phone]           Endoscopic Gastroduodenoscopy Procedure Note    Yamile Patience  1965  [id number]    Indication: Abnormal MRI with evidence of GE junction mass     : Xiomara Briceno MD    Staff: Endoscopy Technician-1: Marline Pop  Endoscopy RN-1: Richmond Duran RN     Referring Provider:  Laurie Green DO      Anesthesia/Sedation:  MAC anesthesia      Procedure Details     After infomed consent was obtained for the procedure, with all risks and benefits of procedure explained the patient was taken to the endoscopy suite and placed in the left lateral decubitus position. Following sequential administration of sedation as per above, the endoscope was inserted into the mouth and advanced under direct vision to second portion of the duodenum. A careful inspection was made as the gastroscope was withdrawn, including a retroflexed view of the proximal stomach; findings and interventions are described below. Findings:   Esophagus: Friable mass seen arising from GE junction, starting at about 40 cm from incisors and extending in to the gastric cardia, about 45 cm from incisors, involving at least 50% of the circumference but not obstructing. Minimal to no resistance felt passing adult upper endoscope across this area. Otherwise esophageal mucosa appeared normal without esophagitis or evidence of Barretts like mucosa. Stomach: Small sliding hiatal hernia. Able to see GE junction mass extending into the cardia on retroflexion. Otherwise gastric mucosa appeared normal.   Duodenum/jejunum: normal with bilious secretions. Therapies:  biopsy of GE junction mass    Specimens:   ID Type Source Tests Collected by Time Destination   1 : 3 [facility]  Bobo Ritchie MD 10/11/2021 1424 Pathology               EBL:   Minimal    Complications:  None; patient tolerated the procedure well.           Impression:      -About 5 x 2 cm GE junction friable mass, suspicious for malignancy. Biopsies obtained and sent to pathology.  -Otherwise negative exam.    Recommendations:  -Await pathology.  -Try to obtain CT abd/pelvis from outside hospital to assess for any metastasis. This could explain elevated liver enzymes.   -Proceed with colonoscopy today.       Jorge Vargas MD  10/11/2021  2:48 PM

## 2021-10-11 NOTE — ANESTHESIA POSTPROCEDURE EVALUATION
Post-Anesthesia Evaluation and Assessment    Patient: Jeffrey Jackson MRN: [id number]  SSN: [id number]    YOB: 1965  Age: 61 y.o. Sex: male      I have evaluated the patient and they are stable and ready for discharge from the PACU. Cardiovascular Function/Vital Signs  Visit Vitals  /66   Pulse (!) 58   Temp 36.3 °C (97.3 °F)   Resp 23   Wt 99.8 kg (220 lb)   SpO2 97%       Patient is status post MAC anesthesia for Procedure(s):  ESOPHAGOGASTRODUODENOSCOPY (EGD) :-  COLONOSCOPY  ESOPHAGOGASTRODUODENAL (EGD) BIOPSY. Nausea/Vomiting: None    Postoperative hydration reviewed and adequate. Pain:  Pain Scale 1: Adult Nonverbal Pain Scale (10/11/21 1453)  Pain Intensity 1: 0 (10/11/21 1453)   Managed    Neurological Status: At baseline    Mental Status, Level of Consciousness: Alert and  oriented to person, place, and time    Pulmonary Status:   O2 Device: None (Room air) (10/11/21 1453)   Adequate oxygenation and airway patent    Complications related to anesthesia: None    Post-anesthesia assessment completed. No concerns    Signed By: Елена Baker MD     October 11, 2021              Procedure(s):  ESOPHAGOGASTRODUODENOSCOPY (EGD) :-  COLONOSCOPY  ESOPHAGOGASTRODUODENAL (EGD) BIOPSY. MAC    <BSHSIANPOST>    INITIAL Post-op Vital signs:   Vitals Value Taken Time   /72 10/11/21 1500   Temp 36.3 °C (97.3 °F) 10/11/21 1453   Pulse 58 10/11/21 1501   Resp 12 10/11/21 1501   SpO2 98 % 10/11/21 1501   Vitals shown include unvalidated device data.

## 2021-10-11 NOTE — PROGRESS NOTES
[address], [address]  [phone]        OSH abdominal CT reviewed with radiology - small liver cysts, no explanation for elevated LFT's, prominent gastrohepatic lymph node. CT disc given to radiology and will be uploaded to PACS. Surg/onc Dr. Lonnie Roman consulted. Discussed role of EUS with Dr. Nito Gomez, recommends medical/oncology consult.     Julian Mak NP

## 2021-10-11 NOTE — ROUTINE PROCESS
Rudell Pulse  1965  [id number]    Situation:  Verbal report received from: Angeline Young RN  Procedure: Procedure(s):  ESOPHAGOGASTRODUODENOSCOPY (EGD) :-  COLONOSCOPY  ESOPHAGOGASTRODUODENAL (EGD) BIOPSY    Background:    Preoperative diagnosis: Anemia  Postoperative diagnosis: GE Junction Mass    :  Dr. Sung Degree  Assistant(s): Endoscopy Technician-1: Cordelia Luther  Endoscopy RN-1: Amy Soriano RN    Specimens:   ID Type Source Tests Collected by Time Destination   1 : [address] [place] [address]  Carmen Valentin MD 10/11/2021 1424 Pathology     H. Pylori  no    Assessment:  Intra-procedure medications   Anesthesia gave intra-procedure sedation and medications, see anesthesia flow sheet yes    Intravenous fluids: NS@ KVO     Vital signs stable     Abdominal assessment: round and soft     Recommendation:  .   Return to floor

## 2021-10-12 ENCOUNTER — TELEPHONE (OUTPATIENT)
Dept: ONCOLOGY | Age: 56
End: 2021-10-12

## 2021-10-12 VITALS
WEIGHT: 220 LBS | SYSTOLIC BLOOD PRESSURE: 151 MMHG | TEMPERATURE: 97.9 F | RESPIRATION RATE: 20 BRPM | DIASTOLIC BLOOD PRESSURE: 92 MMHG | HEART RATE: 78 BPM | OXYGEN SATURATION: 96 %

## 2021-10-12 PROBLEM — R10.9 ABDOMINAL PAIN: Status: ACTIVE | Noted: 2021-10-12

## 2021-10-12 PROBLEM — K31.89 GASTRIC MASS: Status: ACTIVE | Noted: 2021-10-12

## 2021-10-12 LAB
GLUCOSE BLD STRIP.AUTO-MCNC: 153 MG/DL (ref 65–117)
GLUCOSE BLD STRIP.AUTO-MCNC: 287 MG/DL (ref 65–117)
SERVICE CMNT-IMP: ABNORMAL
SERVICE CMNT-IMP: ABNORMAL

## 2021-10-12 PROCEDURE — 82962 GLUCOSE BLOOD TEST: CPT

## 2021-10-12 PROCEDURE — 74011636637 HC RX REV CODE- 636/637: Performed by: INTERNAL MEDICINE

## 2021-10-12 PROCEDURE — 74011250636 HC RX REV CODE- 250/636: Performed by: HOSPITALIST

## 2021-10-12 PROCEDURE — C9113 INJ PANTOPRAZOLE SODIUM, VIA: HCPCS | Performed by: HOSPITALIST

## 2021-10-12 PROCEDURE — 90686 IIV4 VACC NO PRSV 0.5 ML IM: CPT | Performed by: INTERNAL MEDICINE

## 2021-10-12 PROCEDURE — 90471 IMMUNIZATION ADMIN: CPT

## 2021-10-12 PROCEDURE — 74011000258 HC RX REV CODE- 258: Performed by: HOSPITALIST

## 2021-10-12 PROCEDURE — 74011000250 HC RX REV CODE- 250: Performed by: HOSPITALIST

## 2021-10-12 PROCEDURE — 74011250637 HC RX REV CODE- 250/637: Performed by: HOSPITALIST

## 2021-10-12 PROCEDURE — 74011250636 HC RX REV CODE- 250/636: Performed by: INTERNAL MEDICINE

## 2021-10-12 PROCEDURE — 99222 1ST HOSP IP/OBS MODERATE 55: CPT | Performed by: INTERNAL MEDICINE

## 2021-10-12 RX ORDER — PANTOPRAZOLE SODIUM 40 MG/1
40 TABLET, DELAYED RELEASE ORAL DAILY
Qty: 30 TABLET | Refills: 0 | Status: SHIPPED | OUTPATIENT
Start: 2021-10-12 | End: 2021-11-11

## 2021-10-12 RX ORDER — ONDANSETRON 4 MG/1
4 TABLET, ORALLY DISINTEGRATING ORAL
Qty: 10 TABLET | Refills: 0 | Status: SHIPPED | OUTPATIENT
Start: 2021-10-12 | End: 2021-11-11

## 2021-10-12 RX ADMIN — SODIUM CHLORIDE 40 MG: 9 INJECTION, SOLUTION INTRAMUSCULAR; INTRAVENOUS; SUBCUTANEOUS at 08:32

## 2021-10-12 RX ADMIN — PIPERACILLIN AND TAZOBACTAM 3.38 G: 3; .375 INJECTION, POWDER, LYOPHILIZED, FOR SOLUTION INTRAVENOUS at 00:39

## 2021-10-12 RX ADMIN — SUCRALFATE 1 G: 1 TABLET ORAL at 06:49

## 2021-10-12 RX ADMIN — INFLUENZA VIRUS VACCINE 0.5 ML: 15; 15; 15; 15 SUSPENSION INTRAMUSCULAR at 11:32

## 2021-10-12 RX ADMIN — PIPERACILLIN AND TAZOBACTAM 3.38 G: 3; .375 INJECTION, POWDER, LYOPHILIZED, FOR SOLUTION INTRAVENOUS at 08:32

## 2021-10-12 RX ADMIN — INSULIN GLARGINE 10 UNITS: 100 INJECTION, SOLUTION SUBCUTANEOUS at 00:39

## 2021-10-12 RX ADMIN — Medication 10 ML: at 06:49

## 2021-10-12 RX ADMIN — Medication 10 ML: at 06:50

## 2021-10-12 RX ADMIN — SUCRALFATE 1 G: 1 TABLET ORAL at 11:01

## 2021-10-12 RX ADMIN — INSULIN LISPRO 1 UNITS: 100 INJECTION, SOLUTION INTRAVENOUS; SUBCUTANEOUS at 00:39

## 2021-10-12 NOTE — PROGRESS NOTES
Specialty new patient appointment has been scheduled with Dr. Lawyer Wong for Wednesday, 10/20/21 at 1:00 p.m. Pending patient discharge.   Marcella Pruett, Care Management Specialist.

## 2021-10-12 NOTE — PROGRESS NOTES
[address] October 12, 2021       RE: Alie Shown      To Whom It May Concern,    This is to certify that Alie Shown may may return to work on Thursday 10/14/2021 . Please feel free to contact my office if you have any questions or concerns. Thank you for your assistance in this matter.       Sincerely,  Johnathan Zavala MD

## 2021-10-12 NOTE — CONSULTS
[facility] [place] at [address], [id number] [place] [address] Road, [address] [place] [address] Sina  W: [phone]  F: [phone]    Reason for evaluation   Mike Sweeney is a 61 y.o. male who is seen in consultation at the request of Dr. Sarai Davis for evaluation of GEJ mass    Treatment History:   · None yet from us     History of Present Illness:   Patient is a 61 y.o. Male with DM was admitted with abdominal pain on 10/8/2021. Noted to abnormal LFTs with an Alk phos of 399, Bilirubin 1.6, AST 63, ALT 97. CT and USG showed cholelithiasis. MRI did not show any gall stones. Did show a GE junction mass and mild splenomegaly. He was treated with Zosyn for cholangitis with improving LFTs. EGD on 10/11/2021 showed a GEJ mass starting at 40 cm from the incisors and extending to the cardia. Biopsies taken and pathology pending. Colonoscopy was unremarkable. He is a poor historian. States that he has been able to eat all consistencies of food, has no abdominal pain at the moment, no nausea, no bleeding, weight loss, cough, sob or HA        Past Medical History:   Diagnosis Date    Diabetes ([address]    [facility] H/O colonoscopy     Hypertension       Past Surgical History:   Procedure Laterality Date    COLONOSCOPY N/A 10/11/2021    COLONOSCOPY performed by Tyler Zapata MD at [address] [facility] ORTHOPAEDIC      R hip sx; staph infection    HX TONSILLECTOMY        Social History     Tobacco Use    Smoking status: Never Smoker    Smokeless tobacco: Never Used   Substance Use Topics    Alcohol use: Not Currently      History reviewed. No pertinent family history.   Current Facility-Administered Medications   Medication Dose Route Frequency    sodium chloride (NS) flush 5-40 mL  5-40 mL IntraVENous Q8H    sodium chloride (NS) flush 5-40 mL  5-40 mL IntraVENous PRN    insulin glargine (LANTUS) injection 10 Units  10 Units SubCUTAneous QHS    morphine injection 2 mg  2 mg IntraVENous Q4H PRN    sodium chloride (NS) flush 5-40 mL  5-40 mL IntraVENous Q8H    sodium chloride (NS) flush 5-40 mL  5-40 mL IntraVENous PRN    acetaminophen (TYLENOL) tablet 650 mg  650 mg Oral Q6H PRN    Or    acetaminophen (TYLENOL) suppository 650 mg  650 mg Rectal Q6H PRN    polyethylene glycol (MIRALAX) packet 17 g  17 g Oral DAILY PRN    ondansetron (ZOFRAN ODT) tablet 4 mg  4 mg Oral Q8H PRN    Or    ondansetron (ZOFRAN) injection 4 mg  4 mg IntraVENous Q6H PRN    insulin lispro (HUMALOG) injection   SubCUTAneous Q6H    glucose chewable tablet 16 g  4 Tablet Oral PRN    glucagon (GLUCAGEN) injection 1 mg  1 mg IntraMUSCular PRN    piperacillin-tazobactam (ZOSYN) 3.375 g in 0.9% sodium chloride (MBP/ADV) 100 mL MBP  3.375 g IntraVENous Q8H    dextrose (D50W) injection syrg 12.5-25 g  12.5-25 g IntraVENous PRN    pantoprazole (PROTONIX) 40 mg in 0.9% sodium chloride 10 mL injection  40 mg IntraVENous Q12H    sucralfate (CARAFATE) tablet 1 g  1 g Oral AC&HS      No Known Allergies     Review of Systems: A complete review of systems was obtained, negative except as described above. Physical Exam:     Visit Vitals  /87 (BP 1 Location: Right upper arm, BP Patient Position: At rest)   Pulse 72   Temp 98 °F (36.7 °C)   Resp 19   Wt 220 lb (99.8 kg)   SpO2 97%     ECOG PS: 1  General: No distress  Eyes: PERRLA, anicteric sclerae  HENT: Atraumatic  Neck: Supple  Lymphatic: No cervical, supraclavicular, or inguinal adenopathy  Respiratory:  normal respiratory effort  CV: Normal rate, regular rhythm, no murmurs, no peripheral edema  GI: Soft, nontender, nondistended, no masses, no hepatomegaly, no splenomegaly  MS:  Digits without clubbing or cyanosis. Skin: No rashes, ecchymoses, or petechiae. Normal temperature, turgor, and texture.   Psych: Alert, oriented, appropriate affect, normal judgment/insight    Results:     Lab Results   Component Value Date/Time    WBC 5.4 10/11/2021 03:45 AM    HGB 11.8 (L) 10/11/2021 12:18 PM    HCT 35.5 (L) 10/11/2021 03:45 AM    PLATELET 071 40/28/7540 03:45 AM    MCV 87.9 10/11/2021 03:45 AM    ABS. NEUTROPHILS 3.6 10/11/2021 03:45 AM     Lab Results   Component Value Date/Time    Sodium 141 10/11/2021 03:45 AM    Potassium 3.9 10/11/2021 03:45 AM    Chloride 109 (H) 10/11/2021 03:45 AM    CO2 27 10/11/2021 03:45 AM    Glucose 134 (H) 10/11/2021 03:45 AM    BUN 12 10/11/2021 03:45 AM    Creatinine 0.78 10/11/2021 03:45 AM    GFR est AA >60 10/11/2021 03:45 AM    GFR est non-AA >60 10/11/2021 03:45 AM    Calcium 8.9 10/11/2021 03:45 AM    Glucose (POC) 153 (H) 10/12/2021 06:13 AM     Lab Results   Component Value Date/Time    Bilirubin, total 0.6 10/11/2021 12:18 PM    ALT (SGPT) 70 10/11/2021 12:18 PM    Alk. phosphatase 201 (H) 10/11/2021 12:18 PM    Protein, total 7.8 10/11/2021 12:18 PM    Albumin 2.3 (L) 10/11/2021 12:18 PM    Globulin 5.5 (H) 10/11/2021 12:18 PM       Lab Results   Component Value Date/Time    Iron % saturation 26 10/09/2021 05:56 PM    TIBC 257 10/09/2021 05:56 PM    Ferritin 182 10/09/2021 05:56 PM    Vitamin B12 705 10/09/2021 05:56 PM    Folate 18.9 10/09/2021 05:56 PM       Lab Results   Component Value Date/Time    aPTT 26.6 10/08/2021 10:59 AM       Records reviewed and summarized above. Pathology report(s) reviewed above. Radiology report(s) reviewed above. MRI 10/8/2021    IMPRESSION  1. Suspect ulceration or mass at the GE junction. GI consultation for  consideration of endoscopy is recommended. 2. Splenomegaly. 3. No pancreatic or biliary ductal dilation.  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 Etta Vaz    2) Abnormal LFTs  Cholangitis    3) Anemia  Mild, likely due to blood loss    4) Abdominal pain  Resolved      Plan:     · Discussed with Dr. Etta Vaz. He needs staging work up which would need to be co ordinated with local oncologist and  Rad Onc  · [address] [facility] GI Oncology Nurse Navigator    I appreciate the opportunity to participate in Mr. Zelalem England's care.     Signed By: Jonny Brothers MD

## 2021-10-12 NOTE — DISCHARGE SUMMARY
Discharge Summary       PATIENT ID: Veronica Pedroza  MRN: [id number]   YOB: 1965    DATE OF ADMISSION: 10/8/2021  4:14 AM    DATE OF DISCHARGE: 10/12/2021   PRIMARY CARE PROVIDER: Sravani Tong DO     DISCHARGING PROVIDER: Masood Rod MD    To contact this individual call [phone] and ask the  to page. If unavailable ask to be transferred the Adult Hospitalist Department. CONSULTATIONS: IP CONSULT TO GASTROENTEROLOGY  IP CONSULT TO ONCOLOGY  IP CONSULT TO GENERAL SURGERY    PROCEDURES/SURGERIES: Procedure(s):  ESOPHAGOGASTRODUODENOSCOPY (EGD) :-  COLONOSCOPY  ESOPHAGOGASTRODUODENAL (EGD) BIOPSY    ADMITTING DIAGNOSES & HOSPITAL COURSE:   GEJ Mass - suspicious for malignancy   Internal hemorrhoids  BRBPR  Anemia  SIRS no sepsis - cultures negative. CT reviewed      This is a 16-year-old  male with past medical history significant for hypertension and diabetes, who is transferred from [facility] for management of ascending cholangitis with possible sepsis and possible ERCP.  The patient presented to [facility] ED with diffuse abdominal pain, fever, and nausea a week duration. He stated that he went to see his primary care physician yesterday and he had a lab work and he was given medication and went home. He said he took his medication but his fevers, chills, nausea, worse and went to [facility] ER. Although there was concern for choledocholithiasis and cholangitis, CT was negative, and there was no sign of ductal dilitation. MRCP was done which instead showed a GEJ mass. He began having BRBPR, with stable hemoglobin. Underwent EGD and C scope 10/11 which showed a friable semi circumferential mass at the GE junction. This was hgihly suspicious for cancer. No mets seen on Ct abd, but did have a prominent lymph node near the GEJ. Oncology was consulted. Will need close o/p follow up.  Follow up on pathology, and oncology follow up near his home. Will need o/p CT chest.     DISCHARGE DIAGNOSES / PLAN:      Acute GI bleed  BRBPR  - H and H can be spaced to daily, unless having significant bleeding.  - IV PPI   - PRBC if hemoglobin less than 7    - s/p C Scope with internal hemorrhoids.      GE junction mass  - MRCP neg for choledocholithiasis, but shows presence of GE junction mass  - GI following  - EGD 10/11 - with friabel mass which is consistent with malgignancy. Pathology pending   - Tolerated diet, clears for now    - IV PPI --> PO PPI today      SIRS without infection - sepsis ruled out  - DC Zosyn, no sign of infection.   - F/U blood cultures, NGTD. [facility] cultures neg x 4 days ([phone])  - Rapid covid negative      Anemia - likely chronic   - iron profile, ferritin, B12, Folate --> all normal   - transfuse for hemoglobin less than 7     Type 2 diabetes with hyperglycemia A1c 7.0% - SSI, POCT glucose checks and hypoglycemia protocol  Nephrolithiasis - Original pain was pelvic, ? Related to stones. Monitor, pain resolved. Get UA with micro hold     ADDITIONAL CARE RECOMMENDATIONS:   - Please take all medications as prescribed. Note changes as below. **Take pantoprazole 40mg BID   **AVOID NSAIDS - including advil, aleve, BC powder  - Please make sure to follow up with your primary care physician within 1-2 weeks of discharge for hospital follow up. You should also follow up with an oncologist and your GI physician. - Please make sure to continue to monitor for: Chest pain, shortness of breath, high fevers, and return to the Emergency Department with any of these symptoms.   - Please get up slowly from a seated or laying position, avoid falls. - Avoid tobacco, alcohol and other illicit drug use. - Diet restrictions: None  - Activity restrictions: None  - As we discussed we are very concerned about cancer.  Your lesion at the gastro-esophageal junction appears consistent with malignancy, but we must await final pathology before we can diagnose this. You will need close o/p follow up. Please ensure you see a doctor within 1 week of discharge home. You will need a CT scan of the chest as an outpatient by your oncologist.           PENDING TEST RESULTS:   At the time of discharge the following test results are still pending: Final pathology results. FOLLOW UP APPOINTMENTS:    Follow-up Information     Follow up With Specialties Details Why Contact Chantelle Ayala DO Family Medicine In 1 week [facility]  [address] [facility]  [phone]      Oncology   In 1 week      Nathan Beasley MD Hematology and Oncology, Internal Medicine, Hematology, Oncology In 1 week Call for a corky wilson appointment  [address] [place] [address]  [phone]               DIET: Resume previous diet    ACTIVITY: Activity as tolerated    WOUND CARE: None    EQUIPMENT needed: None      DISCHARGE MEDICATIONS:  Current Discharge Medication List      START taking these medications    Details   pantoprazole (PROTONIX) 40 mg tablet Take 1 Tablet by mouth daily for 30 days. Qty: 30 Tablet, Refills: 0  Start date: 10/12/2021, End date: 11/11/2021      ondansetron (ZOFRAN ODT) 4 mg disintegrating tablet Take 1 Tablet by mouth every eight (8) hours as needed for Nausea or Vomiting for up to 30 days. Qty: 10 Tablet, Refills: 0  Start date: 10/12/2021, End date: 11/11/2021         CONTINUE these medications which have NOT CHANGED    Details   aspirin 81 mg chewable tablet Take 81 mg by mouth daily. NOTIFY YOUR PHYSICIAN FOR ANY OF THE FOLLOWING:   Fever over 101 degrees for 24 hours. Chest pain, shortness of breath, fever, chills, nausea, vomiting, diarrhea, change in mentation, falling, weakness, bleeding. Severe pain or pain not relieved by medications. Or, any other signs or symptoms that you may have questions about.     DISPOSITION:   X Home With:   OT  PT  [facility]  RN Long term SNF/Inpatient Rehab    Independent/assisted living    Hospice    Other:       PATIENT CONDITION AT DISCHARGE:     Functional status    Poor     Deconditioned    X Independent      Cognition   X  Lucid     Forgetful     Dementia      Catheters/lines (plus indication)    [place]     PICC     PEG    X None      Code status   X  Full code     DNR      PHYSICAL EXAMINATION AT DISCHARGE:  Visit Vitals  /87 (BP 1 Location: Right upper arm, BP Patient Position: At rest)   Pulse 72   Temp 98 °F (36.7 °C)   Resp 19   Wt 99.8 kg (220 lb)   SpO2 97%     Gen: NAD, awake in bed  HEENT: NC/AT, sclera anicteric, PERRL, EOMI  CV: RRR no m/r/g, normal S1 and S2, no pedal edema   Resp: CTA b/l no increased work of breathing, no wheezing or rhonchi, speaking in full sentences   Abd: NT/ND, normal bowel sounds, no rebound or guarding  Ext: 2+ pulses, no edema  Skin: No rashes or lesions      CHRONIC MEDICAL DIAGNOSES:  Problem List as of 10/12/2021 Date Reviewed: 10/12/2021        Codes Class Noted - Resolved    Gastric mass ICD-10-CM: K31.89  ICD-9-CM: 537.89  10/12/2021 - Present        Abdominal pain ICD-10-CM: R10.9  ICD-9-CM: 789.00  10/12/2021 - Present        Cholangitis ICD-10-CM: K83.09  ICD-9-CM: 576.1  10/8/2021 - Present              Greater than 30 minutes were spent with the patient on counseling and coordination of care    Signed:   Uma Montalvo MD  10/12/2021  11:29 AM

## 2021-10-12 NOTE — PROGRESS NOTES
Faculty or Preceptor Review of Student Work    10/12/2021  - Shift times - 0730 to 1300    The student documentation of patient care for Esau Crimes has been reviewed and approved. All medications have been administered under the direct supervision of the faculty or preceptor.     Davida Choi

## 2021-10-12 NOTE — PROGRESS NOTES
Transition of Care Plan   RUR- Low 8%   DISPOSITION: The disposition plan is home with family assistance   F/U with PCP/Specialist     Transport: Friend; 12pm    Reason for Admission:  Cholangitis            Plan for utilizing home health:      No Skilled needs at this time     PCP: First and Last name:  Alejandra Garcia DO     Name of Practice: [address] [place] [address]    Are you a current patient: Yes/No: Yes    Approximate date of last visit: Unable to recall    Can you participate in a virtual visit with your PCP:                     Current Advanced Directive/Advance Care Plan: Full Code      Healthcare Decision Maker:   Click here to complete [address] including selection of the Healthcare Decision Maker Relationship (ie \"Primary\")                             Transition of Care Plan:                      The pt is uninsured; Screened by [employer] on 10/8 the pt's monthly income is $1,600. The pt's preferred pharmacy is [facility] in [place], [place]. The pt identified that he does not need assistance with obtaining his scripts as he utilizes [facility]. The pt lives alone but he stated that he has a good social support system. The pt did not express any other concerns or barriers at the time of the assessment. Care Management Interventions  PCP Verified by CM: Yes  Mode of Transport at Discharge:  Other (see comment) (Friend)  Transition of Care Consult (CM Consult): Discharge Planning  MyChart Signup: No  Discharge Durable Medical Equipment: No  Physical Therapy Consult: No  Occupational Therapy Consult: No  Speech Therapy Consult: No  Support Systems: Other Family Member(s), Friend/Neighbor  Confirm Follow Up Transport: Self  Discharge Location  Discharge Placement: Home      CM: [address] [place],   [phone]

## 2021-10-12 NOTE — PROGRESS NOTES
Physician Progress Note      PATIENT:               Freedom Huang  CSN #:                  [id number]  :                       1965  ADMIT DATE:       10/8/2021 4:14 AM  DISCH DATE:        10/12/2021 2:04 PM  RESPONDING  PROVIDER #:        Laura Ramos MD        QUERY TEXT:    Type of Anemia: Please provide further specificity, if known. Clinical indicators include: cancer, hemoglobin, hematocrit, platelet, anemia, iron, h and h, hematuria, ferritin, b12, transfuse, hgb, hct, fe, tibc, gi bleed, prbc, bleeding, malignancy, vitamin b12, blood loss  Options provided:  -- Anemia due to acute blood loss  -- Anemia due to chronic blood loss  -- Anemia due to iron deficiency  -- Anemia due to postoperative blood loss  -- Anemia due to chronic disease  -- Other - I will add my own diagnosis  -- Disagree - Not applicable / Not valid  -- Disagree - Clinically Unable to determine / Unknown        PROVIDER RESPONSE TEXT:    The patient has anemia due to chronic blood loss. QUERY TEXT:    Dear attending,    Pt admitted with GIB. Pt noted to have a GE junction mass per EGD. If possible, please document in progress notes and discharge summary the etiology of the GIB. The medical record reflects the following:  Risk Factors: has GE junction mass  Clinical Indicators:10/8-hgb 10.4, 10/11-Hgb 11.4  Per H&P-Anemia, unclear etiology, normal CT  per dc summary- He began having BRBPR, with stable hemoglobin. Underwent EGD and C scope 10/11 which showed a friable semi circumferential mass at the GE junction.   Anemia - likely chronic  - iron profile, ferritin, B12, Folate --> all normal  - transfuse for hemoglobin less than 7  Treatment: GI consult, monitor vital signs, labwork, imaging, IV Protonix 40 mg iv q12 hours, Carafate 1g qid    Thank you,  Radha Alejandro RN, BSN  Clinical documentation Improvement  [phone]  Options provided:  -- GIB due to GE junction mass  -- GIB due to internal hemorrhoids  -- GIB due to diverticulosis  -- GIB due to, please specify cause. -- Other - I will add my own diagnosis  -- Disagree - Not applicable / Not valid  -- Disagree - Clinically unable to determine / Unknown  -- Refer to Clinical Documentation Reviewer    PROVIDER RESPONSE TEXT:    This patient has GIB due to internal hemorrhoids.     Query created by: Yasir Rivera on 10/12/2021 12:03 PM      Electronically signed by:  Lefty Davenport MD 10/12/2021 4:53 PM

## 2021-10-12 NOTE — PROGRESS NOTES
Will call him with workup plan after we get the path results. Transportation will be a real problem. Full note to follow.

## 2021-10-13 LAB
BACTERIA SPEC CULT: NORMAL
SERVICE CMNT-IMP: NORMAL

## 2021-10-14 ENCOUNTER — TELEPHONE (OUTPATIENT)
Dept: ONCOLOGY | Age: 56
End: 2021-10-14

## 2021-10-14 NOTE — TELEPHONE ENCOUNTER
Called patient to discuss referral to oncologist near his home. Busy signal at both numbers. Unable to leave message. Will try again tomorrow.

## 2021-10-18 ENCOUNTER — TELEPHONE (OUTPATIENT)
Dept: ONCOLOGY | Age: 56
End: 2021-10-18

## 2021-10-18 NOTE — TELEPHONE ENCOUNTER
[address] [facility] [facility] and initiated referral for this patient. Attempted to reach patient by phone X 2 using both mobile and home numbers in EMR. Home number consistently states not in service. Mobile number busy signal . I will try reaching the patient again later today. Additionally, [facility] will be reaching out to the patient to schedule appointment.  I have requested Dr. Timmy Juan office fax records to [phone]

## 2021-10-18 NOTE — TELEPHONE ENCOUNTER
Attempted to reach patient via mobile number (home number says it is not in service), busy signal again. Will try tomorrow.

## 2021-10-19 ENCOUNTER — TELEPHONE (OUTPATIENT)
Dept: ONCOLOGY | Age: 56
End: 2021-10-19

## 2021-10-19 NOTE — TELEPHONE ENCOUNTER
Attempted to reach patient. Home phone states out of service. Mobile continues with a busy signal.     Earlier in the week I initiated a referral to [facility] in [facility]. After several attempts to reach patient by phone, I sent the patient a letter with information about [facility] in [facility]. I explained to the patient that a referral was made on his behalf and instructed him to call them if he has not heard from [facility] by the time he receives the letter. Letter was sent to address in EMR.

## 2021-11-08 ENCOUNTER — TELEPHONE (OUTPATIENT)
Dept: ONCOLOGY | Age: 56
End: 2021-11-08

## 2021-11-08 NOTE — TELEPHONE ENCOUNTER
Patient called regarding medications he was prescribed in the hospital? I was hoping you could help. ..     # [phone]

## 2022-03-18 PROBLEM — R10.9 ABDOMINAL PAIN: Status: ACTIVE | Noted: 2021-10-12

## 2022-03-19 PROBLEM — K31.89 GASTRIC MASS: Status: ACTIVE | Noted: 2021-10-12

## 2022-03-19 PROBLEM — K83.09 CHOLANGITIS: Status: ACTIVE | Noted: 2021-10-08

## 2023-05-15 RX ORDER — ASPIRIN 81 MG/1
81 TABLET, CHEWABLE ORAL DAILY
COMMUNITY

## (undated) DEVICE — FORCEPS BX L240CM JAW DIA2.8MM L CAP W/ NDL MIC MESH TOOTH

## (undated) DEVICE — TUBING HYDR IRR --